# Patient Record
Sex: FEMALE | Race: OTHER | NOT HISPANIC OR LATINO | Employment: FULL TIME | URBAN - METROPOLITAN AREA
[De-identification: names, ages, dates, MRNs, and addresses within clinical notes are randomized per-mention and may not be internally consistent; named-entity substitution may affect disease eponyms.]

---

## 2018-11-16 ENCOUNTER — OFFICE VISIT (OUTPATIENT)
Dept: OTOLARYNGOLOGY | Facility: CLINIC | Age: 33
End: 2018-11-16
Payer: COMMERCIAL

## 2018-11-16 VITALS
WEIGHT: 120 LBS | SYSTOLIC BLOOD PRESSURE: 119 MMHG | TEMPERATURE: 98 F | DIASTOLIC BLOOD PRESSURE: 79 MMHG | HEART RATE: 69 BPM

## 2018-11-16 DIAGNOSIS — J30.89 NON-SEASONAL ALLERGIC RHINITIS, UNSPECIFIED TRIGGER: ICD-10-CM

## 2018-11-16 DIAGNOSIS — J34.3 NASAL TURBINATE HYPERTROPHY: ICD-10-CM

## 2018-11-16 DIAGNOSIS — J32.4 CHRONIC PANSINUSITIS: Primary | ICD-10-CM

## 2018-11-16 PROCEDURE — 99204 OFFICE O/P NEW MOD 45 MIN: CPT | Mod: 25,S$GLB,, | Performed by: OTOLARYNGOLOGY

## 2018-11-16 PROCEDURE — 31231 NASAL ENDOSCOPY DX: CPT | Mod: S$GLB,,, | Performed by: OTOLARYNGOLOGY

## 2018-11-16 PROCEDURE — 99999 PR PBB SHADOW E&M-NEW PATIENT-LVL III: CPT | Mod: PBBFAC,,, | Performed by: OTOLARYNGOLOGY

## 2018-11-16 RX ORDER — MONTELUKAST SODIUM 10 MG/1
TABLET ORAL
COMMUNITY
Start: 2018-10-08 | End: 2019-06-27 | Stop reason: SDUPTHER

## 2018-11-16 RX ORDER — AZELASTINE 1 MG/ML
SPRAY, METERED NASAL
COMMUNITY
Start: 2018-10-08 | End: 2020-07-31

## 2018-11-16 RX ORDER — FLUTICASONE PROPIONATE 50 MCG
1 SPRAY, SUSPENSION (ML) NASAL DAILY
COMMUNITY

## 2018-11-16 NOTE — PROGRESS NOTES
Referring Provider:    No referring provider defined for this encounter.  Subjective:   Patient: Ron Mckoy [id number], :1985   Visit date:2018 2:06 PM    Chief Complaint:  Sinus/Allergy    HPI:  Ron is a 32 y.o. female who I was asked to see in consultation for evaluation of the following issue(s):    Sinonasal / Allergy: Ron has bilateral mild nasal obstruction. She reports the the following sinonasal symptoms: allergies, facial pain, facial pressure, post-nasal drip, reduced sense of smell and nasal obstruction.  She denies the the following sinonasal symptoms: aspirin intolerance, asthma, headaches, significant history of dental procedure just prior to the onset of sinus problems and significant history of prior facial trauma or fractures. She has been  on medications for these symptoms. Medications: NASAL SALINE, Flonase, Augmentin, Levaquin, Zyrtec, ORAL STEROID(S) and ORAL ANTILEUKOTRIENE(S) (ie. Singulair) which has been ineffective.  She uses nasal saline irrigations daily.  At one point, she was on continuous antibiotic therapy for approximately 1 month.      She has had 5 or 6 sinus infections in the past 12 months.     She has moderate allergic rhinitis with symptoms including eye itchiness, nasal itchiness, nasal rhinorrhea and sneezing.  She denies the following allergy symptoms:   coughing and wheezing    Allergy testing for this patient was not done.    Current smoker:  No      CT sinus from [facility] shows significant mucosal thickening of the bilateral maxillary sinuses, left ethmoid sinuses, left sphenoid and frontal sinuses. Left solange bullosa.     Review of Systems:  Negative unless checked off.  Gen:  []fever   []fatigue  HENT:  []nosebleeds  []dental problem   Eyes:  []photophobia  []visual disturbance  Resp:  []chest tightness []wheezing  Card:  []chest pain  []leg swelling  GI:  []abdominal pain []blood in stool  :  []dysuria  []hematuria  Musc:  []joint  swelling  []gait problem  Skin:  []color change  []pallor  Neuro:  []seizures  []numbness  Hem:  []bruise/bleed easily  Psych:  []hallucinations  []behavioral problems  Allergy/Imm: has No Known Allergies.    Her meds, allergies, medical, surgical, social & family histories were reviewed & updated:  -     She has a current medication list which includes the following prescription(s): azelastine, cetirizine hcl, fluticasone, lactobacillus rhamnosus gg, levonorgest/eth.estradiol/iron, montelukast, and multivitamin.  -     She  has no past medical history on file.   -     She does not have a problem list on file.   -     She  has no past surgical history on file.  -     She  reports that  has never smoked. She does not have any smokeless tobacco history on file.  -     Her family history is not on file.  -     She has No Known Allergies.    Objective:     Physical Exam:  Vitals:  /79   Pulse 69   Temp 98.2 °F (36.8 °C) (Tympanic)   Wt 54.4 kg (120 lb)   General appearance:  Well developed, well nourished    Eyes:  Extraocular motions intact, PERRL    Communication:  no hoarseness, no dysphonia    Ears:  Otoscopy of external auditory canals and tympanic membranes was normal, clinical speech reception thresholds grossly intact, no mass/lesion of auricle.  Nose:  No masses/lesions of external nose, nasal mucosa, septum, and turbinates were within normal limits.  Mouth:  No mass/lesion of lips, teeth, gums, hard/soft palate, tongue, tonsils, or oropharynx.    Cardiovascular:  No pedal edema; Radial Pulses +2     Neck & Lymphatics:  No cervical lymphadenopathy, no neck mass/crepitus/ asymmetry, trachea is midline, no thyroid enlargement/tenderness/mass.    Psych: Oriented x3,  Alert with normal mood and affect.     Respiration/Chest:  Symmetric expansion during respiration, normal respiratory effort.    Skin:  Warm and intact. No ulcerations of face, scalp, neck.    Assessment & Plan:   Ron was seen today for  other.    Diagnoses and all orders for this visit:    Chronic pansinusitis  -     Nasal/sinus endoscopy    Non-seasonal allergic rhinitis, unspecified trigger    Nasal turbinate hypertrophy  -     Nasal/sinus endoscopy      Ron has chronic sinusitis without polyps and has been on maximal medical therapy as outlined in the HPI.  My recommendation is for endoscopic sinus surgery including balloon sinuplasty of bilateral frontal, sphenoid, maxillary sinuses, partial ethmoidectomies, SMR of turbinates.   We discussed the need for postoperative debridements as well as chronic allergy management postoperatively. Will obtain antigen specific skin testing with intent to start her on immunotherapy.    We discussed at length the risk of sinus surgery including, but not limited to, recurrence of disease, bleeding, infection, septal perforation, tooth or cheek numbness, vision changes, orbital injury, CSF leak and changes in smell.  The patient had opportunity to ask questions and I answered all of them to their satisfaction.  We will proceed as planned.        Thank you for allowing me to participate in the care of Ron.    Toni Carbone MD      Patient: Ron Larioslyly [id number], :1985  Procedure date:2018  Patient's medications, allergies, past medical, surgical, social and family histories were reviewed and updated as appropriate.  Chief Complaint:  Other (second opinion for lymph nodes)    HPI:  Ron is a 32 y.o. female with the history of present illness as discussed in the clinic note from today.    Procedure: Risks, benefits, and alternatives of the procedure were discussed with the patient, and the patient consented to the nasal endoscopy.  The nasal cavity was sprayed with a topical decongestant and anesthetic (if needed). The endoscope was passed into each nostril and each nasal cavity was visualized.  On each side the nasal cavity, sinuses (if open), turbinates, and septum were examined with the  findings described below. At the end of the examination, the scope was removed. The patient tolerated the procedure well with no complications.       Endoscopic Sinonasal Exam Findings:  -     The right side has moderate edema  -     The left side has moderate edema  -     Nasal secretions: No discolored secretions noted bilaterally  -     Nasal septum: no significant deviation or perforation appreciated   -     Inferior turbinate: hypertrophy or edema (Moderate) bilaterally  -     Middle turbinate: hypertrophy or edema (Severe) on the left  -     Other findings: No mass or obstructive lesion    Assessment & Plan:  - see today's clinic note

## 2018-11-28 ENCOUNTER — CLINICAL SUPPORT (OUTPATIENT)
Dept: OTOLARYNGOLOGY | Facility: CLINIC | Age: 33
End: 2018-11-28
Payer: COMMERCIAL

## 2018-11-28 DIAGNOSIS — J30.9 ALLERGIC RHINITIS, UNSPECIFIED SEASONALITY, UNSPECIFIED TRIGGER: ICD-10-CM

## 2018-11-28 PROCEDURE — 99999 PR PBB SHADOW E&M-EST. PATIENT-LVL II: CPT | Mod: PBBFAC,,,

## 2018-11-28 PROCEDURE — 95004 PERQ TESTS W/ALRGNC XTRCS: CPT | Mod: S$GLB,,, | Performed by: OTOLARYNGOLOGY

## 2018-11-28 PROCEDURE — 95024 IQ TESTS W/ALLERGENIC XTRCS: CPT | Mod: S$GLB,,, | Performed by: OTOLARYNGOLOGY

## 2018-11-28 RX ORDER — AMOXICILLIN 500 MG
1 CAPSULE ORAL DAILY
COMMUNITY
End: 2020-08-07

## 2018-11-28 RX ORDER — OXYCODONE AND ACETAMINOPHEN 5; 325 MG/1; MG/1
TABLET ORAL
Qty: 6 TABLET | Refills: 0 | Status: SHIPPED | OUTPATIENT
Start: 2018-11-28 | End: 2019-01-22 | Stop reason: SDUPTHER

## 2018-11-28 RX ORDER — DIAZEPAM 5 MG/1
TABLET ORAL
Qty: 1 TABLET | Refills: 0 | Status: SHIPPED | OUTPATIENT
Start: 2018-11-28 | End: 2019-01-22

## 2018-11-28 NOTE — Clinical Note
Good morning - I did allergy testing on this patient of your's.  She mentioned that she is interested in pursuing SLIT.  I told her that I would forward you her results and see if you and Dr. Carbone are ok with getting her started on that therapy.  Thanks,Dionne

## 2018-11-28 NOTE — TELEPHONE ENCOUNTER
Patient:  Ron Marin MRN:  [id number]  :  1985  Ordering Physician:  Toni Carbone MD     SLIT formulation - according to MQT Results         RED VIAL:  Sublingually as directed   Fusarium 1:40 w/v 1mL   [place] Birch 1:20 w/v 1mL   Bald [place] 1:80 w/v 1mL   Nettle Weed 1:40 w/v 1mL   Addison Grass 1:20 w/v 1mL   Bermuda Grass 10,000 BAU / mL 1mL   Cockroach  1:20 w/v 1mL   Mold Mix #1 1:20 w/v 1mL   Central / Easter Tree Mix #4 1:20 w/v 1mL   National Weed Mix 1:20 w/v 1mL   Grass Mix GS7 10,000 BAU / mL 1mL   Standardized Mite Mix 5,000 AU / mL each 1mL       GREEN VIAL FORMULATION: sublingually as directed   Volume added from RED Maintenance vial  0.25 mL   50% Glycerin  1mL

## 2018-11-28 NOTE — PROGRESS NOTES
After two patient identifiers and written consent were obtained, patient's allergies and medications were reviewed and changes were made as necessary. Testing was discussed in detail. Patient confirmed she does not have asthma, has not been experienced wheezing within the last seven days, has not used an inhaler within the last seven days, is not currently on a beta blocker, and is not on any other medications that are contraindicated for allergy testing.    The patient's forearms were cleansed with alcohol, and the allergen extracts were applied using the Skintestor OMNI device according to departmental procedures and guidelines.  Further intradermal skin testing was then completed using dilutions from allergens on the diagnostic panel according to departmental procedures and guidelines.    No past medical history on file.  Review of patient's allergies indicates:  No Known Allergies    Results obtained from the Modified Quantitative Testing (MQT), including skin endpoint titration (SET) are as follows:    ALLERGENS HIGHLIGHTED RED - ENDPOINT 6  ALLERGENS HIGHLIGHTED ORANGE - ENDPOINT 5    Allergen MTII Wheal   SIZE Dilution #   to be tested Intradermal   Wheal Size MQT   ENDPOINT   Histamine   (+ control) 9      *Alternaria 5 5 5 4   *Apergillus 7 5 0 4   Fusarium 5 5 7 5   *Cladosporium Sphaer 9 - - 6   Cladosporium Herb 3 5 3 4   *Penicillium Polina 5 5 3 4   Mucor Race 3 5 5 4   *Bipolaris 3 5 5 4   Glycerin   (- control) 0             *American Elm 5 5 3 4   *Stuart 9 - - 6   Carmel 0 2 3 0   *Pecan 9 - - 6   *[place] 7 5 3 4   Gautam 7 5 3 4   Briscoe Birch 9 - - 6   Red [place] 5 5 0 4   Bald [place] 9 - - 6   Red Bertie 0 2 3 0          *Short Ragweed 5 5 0 4   English Plantain 3 5 3 4   Marsh Elder 0 2 5 0   Mugwort Jacob 3 5 5 4   Dock-Sorrel Mix 0 2 7 3   *Spiny Pigweed 9 - - 6   Baccharis Weed 7 5 0 4   *Cocklebur [place] 5 5 3 4   *Lambs Quarter 5 5 5 4   Nettle Weed 5 5 7 5          Addison Grass 7 5  9 6   Bermuda Grass 5 5 7 5   *Esequiel Grass 5 5 3 4   *[place] 7 5 3 4   *Farinae Mite 9 - - 6   *Pteronyssiunus Mite 9 - - 6   Cockroach 9 - - 6   Cat Hair 5 5 5 4   Dog Epithelia 7 5 5 4   Feather Mix 7 5 3 4     *Allergens included in sublingual allergen mixtures    Patient tolerated testing well, no complaints of pain, discomfort, or shortness of breath. Information on allergens and methods to prevent exposure provided to patient. Discussed the different forms of immunotherapy offered by our office. Patient is scheduled to see Dr. Carbone on 18 advised that Dr. Carbone will review allergy testing in detail at that time. Instructed to contact our office with any questions or concerns. Patient verbalized understanding.     Mrs. Marin expressed interest in pursuing sublingual immunotherapy.  Below is the suggested SLIT formulation, according to her MQT results listed above.    Patient:  Ron Marin MRN:  [id number]  :  1985  Ordering Physician:  Toni Carbone MD    SLIT formulation - according to MQT Results  RED VIAL:  Sublingually as directed   Fusarium 1:40 w/v 1mL   Laurel Birch 1:20 w/v 1mL   Bald [place] 1:80 w/v 1mL   Nettle Weed 1:40 w/v 1mL   Addison Grass 1:20 w/v 1mL   Bermuda Grass 10,000 BAU / mL 1mL   Cockroach  1:20 w/v 1mL   Mold Mix #1 1:20 w/v 1mL   Central / Easter Tree Mix #4 1:20 w/v 1mL   National Weed Mix 1:20 w/v 1mL   Grass Mix GS7 10,000 BAU / mL 1mL   Standardized Mite Mix 5,000 AU / mL each 1mL   GREEN VIAL FORMULATION: sublingually as directed   Volume added from RED Maintenance vial  0.25 mL   50% Glycerin  1mL

## 2018-12-12 ENCOUNTER — PATIENT MESSAGE (OUTPATIENT)
Dept: OTOLARYNGOLOGY | Facility: CLINIC | Age: 33
End: 2018-12-12

## 2018-12-14 RX ORDER — METHYLPREDNISOLONE 4 MG/1
TABLET ORAL
Qty: 1 PACKAGE | Refills: 0 | Status: SHIPPED | OUTPATIENT
Start: 2018-12-14 | End: 2019-01-04

## 2018-12-14 NOTE — PROGRESS NOTES
Patient called in for increased nasal congestion x 2-3 days. She reports increased facial pain /pressure. Denies discolored mucus, however, c/o increased thick, clear rhinorrhea and post-nasal drip. She is taking anti-histamine QHS, performing daily nasal saline rinses BID, Flonase/Astelin, and mucinex/ sudafed. She denies any fever or cough. She is scheduled for a balloon sinusplasty and bilateral inferior turbinate reduction next week with Dr. Carbone.     After speaking with Dr. Carbone, will issue the pt a Medrol Dosepak. This will not interfere with her procedure scheduled for next week. Advised pt to please contact us if symptoms do not improve, worsen, or change. Pt voiced agreeing.

## 2018-12-18 ENCOUNTER — TELEPHONE (OUTPATIENT)
Dept: OTOLARYNGOLOGY | Facility: CLINIC | Age: 33
End: 2018-12-18

## 2018-12-18 NOTE — TELEPHONE ENCOUNTER
I called Walter with referrals department at [phone] ext: [phone]. She stated [facility] denied procedure due to stating the CT scan was not submitted in whole form and without a signiture. Walter spoke with the nurse with [employer], Tita, and clarified there is a signature at the bottom of the CT report. [employer] stated they need medical records of office visits of at least 12 weeks of medication failure(s) prior to CT performed and what medications were specifically issued and patient failed tx with. Walter gave me Tita's direct #, [phone], referral #[id number]. I then called Tita and left a vm. I called Walter back to let her know I was unable to reach Tita and if she hears from her or is able to get a hold of her to please let me know and I will speak to her at anytime regarding the above clinical information.     To note: In Dr. Carbone's last OV HPI, he does list the specific medications the pt failed prior to her CT and that she was taking this combination of antibiotics/steriods/ anti-histamines for over one month. She was also seen by ENT with KRISTINE and all records prior to seeing us are available under care everywhere. This includes prior ENT OV notes with Dr. Kizzy Odell and her CT in May. I expressed in my vm that the denial/ additional time to review this procedure has caused regression in patient's sinus disease and since we have had to provide additional medication therapy.  ------------------------------------------------------------    Tita returned my call.   She stated [facility] is denying procedure due to needing proof of medications issued and failed, dates of when issued by which providers prior to the patients CT. Stated this needs to be at least 12 weeks of failed medical therapy and proof of prior to patient's CT. Stated sinus cavity criteria for balloon is 3 mm or greater of disease, they will not be able to approve ethmoids. Stated maxillary cavities should be approved. I clarified per Dr. Rios note we  are requesting bilateral maxillary, bilateral frontals, left ethmoid, and left sphenoid. Tita gave me the fax # to fax additional records: [phone]  I explained pt has been seen by chronic sinusitis since Oct 2017 documented with meds failed prior to May CT. This was with KRISTINE, both her PCP and ENT. I referenced Dr. Carbone's OV note which reviews all of this in specifics of medications and how long patient took them. Tita explained this will not suffice for [employer] and that they are requesting proof and stated pharmacy records may help support OV note(s).     I contacted our nurse to let her know the above and to fax additional records.

## 2018-12-21 ENCOUNTER — TELEPHONE (OUTPATIENT)
Dept: OTOLARYNGOLOGY | Facility: CLINIC | Age: 33
End: 2018-12-21

## 2018-12-21 NOTE — TELEPHONE ENCOUNTER
Faxed all care everywhere records to [facility] at [phone] to assist with authorization for in office procedure with Dr. Toni Carbone.

## 2018-12-21 NOTE — TELEPHONE ENCOUNTER
Released faxed to [facility] for CT scan from 5/11/18 to submit to [employer] for authorization for procedure with Dr. Carbone.

## 2018-12-26 ENCOUNTER — CLINICAL SUPPORT (OUTPATIENT)
Dept: OTOLARYNGOLOGY | Facility: CLINIC | Age: 33
End: 2018-12-26

## 2018-12-26 DIAGNOSIS — J30.9 ALLERGIC RHINITIS, UNSPECIFIED SEASONALITY, UNSPECIFIED TRIGGER: ICD-10-CM

## 2018-12-26 PROCEDURE — 95199 UNLISTED ALL/IMMLG SVC/PX: CPT | Mod: S$GLB,,, | Performed by: OTOLARYNGOLOGY

## 2018-12-26 NOTE — PROGRESS NOTES
Patient was given verbal and written instructions  on how to use the sublingual build- up vials.  Patient received first dose in the clinic and waited 20 minutes patient did not have a reaction. Patient had no further questions and will call the clinic when she is ready for her next maintenance vial.    Patient:  Ron Marin MRN:  [id number]  :  1985  Ordering Physician:  Toni Carbone MD     SLIT formulation - according to MQT Results                        RED VIAL:  Sublingually as directed      Fusarium 1:40 w/v 1mL      [place] Birch 1:20 w/v 1mL      Bald [place] 1:80 w/v 1mL      Nettle Weed 1:40 w/v 1mL      Addison Grass 1:20 w/v 1mL      Bermuda Grass 10,000 BAU / mL 1mL      Cockroach  1:20 w/v 1mL      Mold Mix #1 1:20 w/v 1mL      Central / Easter Tree Mix #4 1:20 w/v 1mL      National Weed Mix 1:20 w/v 1mL      Grass Mix GS7 10,000 BAU / mL 1mL      Standardized Mite Mix 5,000 AU / mL each 1mL               GREEN VIAL FORMULATION: sublingually as directed      Volume added from RED Maintenance vial  0.25 mL      50% Glycerin  1mL

## 2019-01-08 ENCOUNTER — PROCEDURE VISIT (OUTPATIENT)
Dept: OTOLARYNGOLOGY | Facility: CLINIC | Age: 34
End: 2019-01-08
Payer: COMMERCIAL

## 2019-01-08 ENCOUNTER — TELEPHONE (OUTPATIENT)
Dept: OTOLARYNGOLOGY | Facility: CLINIC | Age: 34
End: 2019-01-08

## 2019-01-08 VITALS — HEART RATE: 62 BPM | DIASTOLIC BLOOD PRESSURE: 73 MMHG | SYSTOLIC BLOOD PRESSURE: 99 MMHG

## 2019-01-08 DIAGNOSIS — J34.3 NASAL TURBINATE HYPERTROPHY: ICD-10-CM

## 2019-01-08 DIAGNOSIS — J32.4 CHRONIC PANSINUSITIS: Primary | ICD-10-CM

## 2019-01-08 PROCEDURE — 31296 NSL/SINS NDSC SURG FRNT SINS: CPT | Mod: 51,RT,S$GLB, | Performed by: OTOLARYNGOLOGY

## 2019-01-08 PROCEDURE — 30140 RESECT INFERIOR TURBINATE: CPT | Mod: 50,S$GLB,, | Performed by: OTOLARYNGOLOGY

## 2019-01-08 PROCEDURE — 31296 PR ENDOSCOPY, NASAL/SINUS, FRNTL SINUS OSTIUM, W/BALLOON DILAT: ICD-10-PCS | Mod: 51,RT,S$GLB, | Performed by: OTOLARYNGOLOGY

## 2019-01-08 PROCEDURE — 30140 PR EXCISION TURBINATE,SUBMUCOUS: ICD-10-PCS | Mod: 50,S$GLB,, | Performed by: OTOLARYNGOLOGY

## 2019-01-08 PROCEDURE — 31295 PR ENDOSCOPY, NASAL/SINUS, MAXILL SINUS OSTIUM, W/BALLOON DILAT: ICD-10-PCS | Mod: 50,51,S$GLB, | Performed by: OTOLARYNGOLOGY

## 2019-01-08 PROCEDURE — 31295 NSL/SINS NDSC SURG MAX SINS: CPT | Mod: 50,51,S$GLB, | Performed by: OTOLARYNGOLOGY

## 2019-01-08 PROCEDURE — 31254 NSL/SINS NDSC W/PRTL ETHMDCT: CPT | Mod: 50,51,S$GLB, | Performed by: OTOLARYNGOLOGY

## 2019-01-08 PROCEDURE — 31254 PR NASAL/SINUS ENDOSCOPY,REMV PART ETHMOID: ICD-10-PCS | Mod: 50,51,S$GLB, | Performed by: OTOLARYNGOLOGY

## 2019-01-08 PROCEDURE — 31297 PR ENDOSCOPY, NASAL/SINUS, SPHEN SINUS OSTIUM, W/BALLOON DILAT: ICD-10-PCS | Mod: 50,59,S$GLB, | Performed by: OTOLARYNGOLOGY

## 2019-01-08 PROCEDURE — 31297 NSL/SINS NDSC SURG SPHN SINS: CPT | Mod: 50,59,S$GLB, | Performed by: OTOLARYNGOLOGY

## 2019-01-08 RX ORDER — DIAZEPAM 5 MG/1
5 TABLET ORAL EVERY 6 HOURS PRN
Qty: 5 TABLET | Refills: 0 | Status: SHIPPED | OUTPATIENT
Start: 2019-01-08 | End: 2019-01-22 | Stop reason: ALTCHOICE

## 2019-01-08 RX ORDER — OXYCODONE AND ACETAMINOPHEN 5; 325 MG/1; MG/1
1 TABLET ORAL EVERY 4 HOURS PRN
Qty: 10 TABLET | Refills: 0 | Status: SHIPPED | OUTPATIENT
Start: 2019-01-08 | End: 2019-01-22

## 2019-01-08 RX ORDER — AMOXICILLIN AND CLAVULANATE POTASSIUM 875; 125 MG/1; MG/1
1 TABLET, FILM COATED ORAL EVERY 12 HOURS
Qty: 14 TABLET | Refills: 0 | Status: SHIPPED | OUTPATIENT
Start: 2019-01-08 | End: 2019-01-15

## 2019-01-08 NOTE — TELEPHONE ENCOUNTER
Pt was calling to verify that her procedure has been approved.  Advised pt that per her referral it is approved.  Pt verbalized understanding.

## 2019-01-08 NOTE — TELEPHONE ENCOUNTER
----- Message from Balwinder Lagunas sent at 1/8/2019 10:49 AM CST -----  Contact: self [phone]  Would like to consult with nurse regarding procedure today. Please call back at [phone].  Md Dillon

## 2019-01-09 NOTE — PROCEDURES
Procedure Note       Surgeon: Toni Carbone MD    Pre Procedure Diagnosis:  Chronic pansinusitis, turbinate hypertrophy    Post Procedure Diagnosis: Same    Anesthesia: Topical Ponticaine 5%, Lidocaine 1% with epinephrine 1/100K    Technical Procedures Used:     bilateral balloon maxillary sinuplasty  bilateral partial ethmoidectomy  bilateral balloon sphenoid dilation  right balloon frontal sinus dilation    Complications: No    Estimated Blood Loss:  30cc           Justification for the operation:     Ron has a history of chronic sinusitis documented by history, physical and CT scan and has been on maximal medical therapy as outlined in prior clinic notes.  We discussed at length the risk of sinus surgery including, but not limited to, recurrence of disease, bleeding, infection, septal perforation, tooth or cheek numbness, vision changes, orbital injury, CSF leak and changes in smell.  The patient had opportunity to ask questions and I answered all of them to their satisfaction.  We will proceed as planned.      Procedure in Detail:          The nasal cavities were decongested with topical neosynephrine and 2.5% ponticaine spray.  Following this, pledgets were placed into the nasal cavity soaked in 5% ponticaine and allowed to remain for approximately 15 minutes.  Finally 5 cc of 1% lidocaine with epinephrine 1/100k were injected into the middle turbinate, inferior turbinate and OMC bilateral.      I began the procedure on the right, inspecting the nasal cavity with the 30 degree endoscope.  The middle turbinate was gently medialized using a Breathitt elevator allowing for visualization of the uncinate, ethmoid bulla and frontal sinus recess.    I then performed a right sided partial ethmoidectomy.  The ethmoid sinuses were entered through the bulla with use of the Zigabid 4 mm micro debrider and non through cut forceps..   Care was taken to not disrupt the lamina papyracea, fovea ethmoidalis, or middle/superior  turbinate attachment to skull base during the complete dissection.  About 0% ethmoid mucosa was exenterated.  The ethmoids had marked edema with polypoid changes and - no discolored secretions noted -.  Additional details/findings: none    The same procedure was performed on the other side describing a   partial  ethmoidectomy.  This ethmoid had marked edema with polypoid changes and - no discolored secretions noted -.  Additional details/findings: none    We then performed a right sided maxillary balloon dilation using the Acclarent SPIN maxillary balloon sinuplasty (6X16mm) .   The maxillary sinus was entered through the natural ostium with the guidewire and confirmed by transillumination of the cheek.  The balloon was then inflated to 12 john with sequential dilation of the maxillary ostium.  The maxillary had normal mucosa and - no discolored secretions noted -.  Additional details/findings: none      The same procedure was performed on the other side describing a maxillary balloon dilation using the Acclarent SPIN maxillary balloon sinuplasty (6X16mm).  This maxillary had moderate edema and - no discolored secretions noted -.  Additional details/findings: none    We then performed a right sided sphenoid balloon dilation using the Acclarent SPIN balloon sinuplasty (6X16mm).   The sinus was entered through the natural ostium.  The middle turbinate was partially lateralized, the superior turbinate was visualized and the ostium was entered.  The balloon was then inflated to 12 john with sequential dilation.   The sphenoid had normal mucosa and - no discolored secretions noted -.  Additional details/findings: none      The same procedure was performed on the other side describing a sphenoid balloon dilation using the Acclarent SPIN balloon sinuplasty (6X16mm).  This sinus had mild edema and - no discolored secretions noted -.  Additional details/findings: none.      We then performed a right sided frontal balloon  dilation using the Acclarent SPIN balloon sinuplasty (6X16mm).   The sinus was entered through the natural ostium.  The middle turbinate was partially lateralized, the superior turbinate was visualized and the ostium was entered.  The balloon was then inflated to 12 john with sequential dilation.   The sphenoid had normal mucosa and - no discolored secretions noted -.  Additional details/findings: none      Attempted to performed the same procedure on the left side but after numerous attempts, was unable to enter the natural ostium of the left frontal sinus.    I then performed submucosal resection of the inferior turbinates. The eleni 2mm turbinate blade was used to make an incision in the head of the inferior turbinate.  The instrument was then tunneled submucosally along the inferior turbinate and the instrument was activated.  Several passes superiorly and inferiorly were made until there was significant reduction of the tissue and a patent nasal passageway. The nasal cavity was suctioned free of blood and saline and found to be hemostatic.  The same procedure was performed on the left side.  However, she began to have fairly profuse bleeding from the left side while this was being performed.  After trying to cauterize this with the Coblator, this ultimately persisted in bleeding. A Merocel nasal packing was placed in the left side.  At this point, she began bleeding from the right inferior turbinate.  Again I cauterized this area but it continued to significantly bleed so this side was packed as well.  At this point there was no further bleeding.      The patient tolerated the procedure well and had no significant pain at completion of the case.

## 2019-01-22 ENCOUNTER — OFFICE VISIT (OUTPATIENT)
Dept: OTOLARYNGOLOGY | Facility: CLINIC | Age: 34
End: 2019-01-22
Payer: COMMERCIAL

## 2019-01-22 VITALS — HEART RATE: 67 BPM | SYSTOLIC BLOOD PRESSURE: 112 MMHG | DIASTOLIC BLOOD PRESSURE: 78 MMHG | WEIGHT: 124.56 LBS

## 2019-01-22 DIAGNOSIS — J32.4 CHRONIC PANSINUSITIS: Primary | ICD-10-CM

## 2019-01-22 DIAGNOSIS — J30.89 NON-SEASONAL ALLERGIC RHINITIS, UNSPECIFIED TRIGGER: ICD-10-CM

## 2019-01-22 PROCEDURE — 99999 PR PBB SHADOW E&M-EST. PATIENT-LVL III: ICD-10-PCS | Mod: PBBFAC,,, | Performed by: OTOLARYNGOLOGY

## 2019-01-22 PROCEDURE — 99024 POSTOP FOLLOW-UP VISIT: CPT | Mod: S$GLB,,, | Performed by: OTOLARYNGOLOGY

## 2019-01-22 PROCEDURE — 87077 CULTURE AEROBIC IDENTIFY: CPT

## 2019-01-22 PROCEDURE — 99999 PR PBB SHADOW E&M-EST. PATIENT-LVL III: CPT | Mod: PBBFAC,,, | Performed by: OTOLARYNGOLOGY

## 2019-01-22 PROCEDURE — 87186 SC STD MICRODIL/AGAR DIL: CPT

## 2019-01-22 PROCEDURE — 87070 CULTURE OTHR SPECIMN AEROBIC: CPT

## 2019-01-22 PROCEDURE — 99024 PR POST-OP FOLLOW-UP VISIT: ICD-10-PCS | Mod: S$GLB,,, | Performed by: OTOLARYNGOLOGY

## 2019-01-22 RX ORDER — OXYCODONE AND ACETAMINOPHEN 5; 325 MG/1; MG/1
TABLET ORAL
Qty: 6 TABLET | Refills: 0 | Status: CANCELLED | OUTPATIENT
Start: 2019-01-22

## 2019-01-22 RX ORDER — OXYCODONE AND ACETAMINOPHEN 5; 325 MG/1; MG/1
TABLET ORAL
Qty: 6 TABLET | Refills: 0 | Status: SHIPPED | OUTPATIENT
Start: 2019-01-22 | End: 2019-01-22

## 2019-01-22 NOTE — PROGRESS NOTES
Subjective:   Patient: Ron Marin [id number], :1985   Visit date:2019 2:27 PM    Chief Complaint: Status post sinus surgery    HPI:  Ron is a 33 y.o. female with Chronic sinusitis.    Subjective:  She was feeling fairly well until the last 2 or 3 days.  She began having more facial pressure and pain as well as nasal drainage.  She has been compliant with nasal saline irrigations.    Surgery: 2019    Sinuses operated/OR findings:   Balloon dilation of bilateral frontal, maxillary, sphenoid  Bilateral partial ethmoidectomies  Solitario SMR turbinates      Review of Systems:  -     Allergic/Immunologic: has No Known Allergies..  -     Constitutional: Current temp:  afebrile    Her meds, allergies, medical, surgical, social & family histories were reviewed & updated:  -     She has a current medication list which includes the following prescription(s): allergy mix, azelastine, cetirizine hcl, diazepam, fish oil-omega-3 fatty acids, fluticasone, lactobacillus rhamnosus gg, levonorgest/eth.estradiol/iron, montelukast, multivitamin, oxycodone-acetaminophen, and oxycodone-acetaminophen.  -     She  has no past medical history on file.   -     She does not have any pertinent problems on file.   -     She  has no past surgical history on file.  -     She  reports that  has never smoked. She does not have any smokeless tobacco history on file.  -     Her family history is not on file.  -     She has No Known Allergies.    Objective:   Physical Exam:  Vitals:  /78   Pulse 67   Wt 56.5 kg (124 lb 9 oz)   General appearance:  Well developed, well nourished    Eyes:  Extraocular motions intact, PERRL    Communication:  no hoarseness, no dysphonia    Ears:  Otoscopy of external auditory canals and tympanic membranes was normal, clinical speech reception thresholds grossly intact, no mass/lesion of auricle.  Nose:  No masses/lesions of external nose, nasal mucosa, septum, and turbinates were within  normal limits.  Mouth:  No mass/lesion of lips, teeth, gums, hard/soft palate, tongue, tonsils, or oropharynx.    Cardiovascular:  No pedal edema; Radial Pulses +2     Neck & Lymphatics:  No cervical lymphadenopathy, no neck mass/crepitus/ asymmetry, trachea is midline, no thyroid enlargement/tenderness/mass.    Psych: Oriented x3,  Alert with normal mood and affect.     Respiration/Chest:  Symmetric expansion during respiration, normal respiratory effort.    Skin:  Warm and intact. No ulcerations of face, scalp, neck.      Assessment & Plan:   Chronic Rhinosinusitis s/p endoscopic sinus surgery.  Individualized endoscopic debridements following routine functional endoscopic sinus surgery (FESS) provide improved long-term outcomes and may prevent future, more extensive revision procedures. Although two to three debridements in the first 30 days is typical for the majority of patients, once a week may be an appropriate frequency for postoperative debridement  in select patients with difficult problems. However, the frequency and length of time for which debridement is medically necessary will vary from case to case and must be individualized, a conclusion, which multiple studies analyzing debridement outcomes have acknowledged.  While 2-3 debridements will likely suffice in the majority of cases, there are situations in which a patient may require more frequent, very long-term debridements. Clinically, these include, but are not limited to, persistent crusting within the surgical bed, adhesion formation noted upon examination, more extensive surgery (eg, complex frontal sinusotomies, neoplasm  resections), underlying immunologic disorders, diffuse polyposis, revision FESS, mucociliary disorders, allergic fungal sinusitis, and postoperative complications (eg, visual loss, cerebrospinal fluid leak, nasal hemorrhage).     Asima presents today with significant crusting, discharge, synechia formation or narrowing of sinus  ostia.  Therefore, the decision for endoscopic sinus debridement was determined to be necessary.    Ron will return to clinic in 3 weeks.    medical regimen: Flonase, astelin, singulair, xyzal.  SLIT    Will plan for budesonide irrigations.  Cultures taken today.          NASAL ENDOSCOPY WITH POLYPECTOMY &/OR DEBRIDEMENT  (cpt 92682)  Procedure: Risks, benefits, and alternatives of the procedure were discussed with the patient, and the patient consented to the nasal endoscopy with debridement.  The nasal cavity was sprayed with a topical decongestant and anesthetic . The endoscope was passed into each nostril and each nasal cavity was visualized.  On each side the nasal cavity, sinuses (if open), turbinates, and septum were examined with the findings described below.  Crusting and polypoid material was removed with suction and straight non thru cut forceps.   At the end of the examination, the scope was removed. The patient tolerated the procedure well with no complications.     Endoscopic Sinonasal Exam Findings:  -     Sinuses examined: bilateral maxillary, bilateral ethmoid anterior, bilateral sphenoid and bilateral frontal            The right sinus(es) have mild edema            The left sinus(es) have normal mucosa  -     Nasal secretions: dried crusts, purulent secretions and thick glue-like mucous on the right  -     Nasal septum: no significant deviation and no perforation appreciated   -     Inferior turbinate: - normal mucosa without significant hypertrophy - bilaterally  -     Middle turbinate: turbinate partially resected on the right  -     Other findings: - no mass or obstructive lesion -    Assessment & Plan:  See today's clinic note.

## 2019-01-25 LAB — BACTERIA SPEC AEROBE CULT: NORMAL

## 2019-01-25 RX ORDER — FLUCONAZOLE 100 MG/1
100 TABLET ORAL DAILY
Qty: 14 TABLET | Refills: 0 | Status: SHIPPED | OUTPATIENT
Start: 2019-01-25 | End: 2019-02-08

## 2019-01-25 RX ORDER — SULFAMETHOXAZOLE AND TRIMETHOPRIM 800; 160 MG/1; MG/1
1 TABLET ORAL 2 TIMES DAILY
Qty: 20 TABLET | Refills: 0 | Status: SHIPPED | OUTPATIENT
Start: 2019-01-25 | End: 2019-01-29

## 2019-01-27 ENCOUNTER — OFFICE VISIT (OUTPATIENT)
Dept: URGENT CARE | Facility: CLINIC | Age: 34
End: 2019-01-27
Payer: COMMERCIAL

## 2019-01-27 VITALS
TEMPERATURE: 99 F | HEART RATE: 146 BPM | WEIGHT: 123.25 LBS | HEIGHT: 63 IN | BODY MASS INDEX: 21.84 KG/M2 | DIASTOLIC BLOOD PRESSURE: 70 MMHG | SYSTOLIC BLOOD PRESSURE: 100 MMHG

## 2019-01-27 DIAGNOSIS — R00.0 TACHYCARDIA: ICD-10-CM

## 2019-01-27 DIAGNOSIS — R68.89 FLU-LIKE SYMPTOMS: ICD-10-CM

## 2019-01-27 DIAGNOSIS — R50.9 FEVER, UNSPECIFIED FEVER CAUSE: Primary | ICD-10-CM

## 2019-01-27 LAB
CTP QC/QA: YES
CTP QC/QA: YES
POC MOLECULAR INFLUENZA A AGN: NEGATIVE
POC MOLECULAR INFLUENZA B AGN: NEGATIVE
S PYO RRNA THROAT QL PROBE: NEGATIVE

## 2019-01-27 PROCEDURE — 99214 PR OFFICE/OUTPT VISIT, EST, LEVL IV, 30-39 MIN: ICD-10-PCS | Mod: S$GLB,,, | Performed by: PHYSICIAN ASSISTANT

## 2019-01-27 PROCEDURE — 87502 POCT INFLUENZA A/B MOLECULAR: ICD-10-PCS | Mod: QW,S$GLB,, | Performed by: PHYSICIAN ASSISTANT

## 2019-01-27 PROCEDURE — 99999 PR PBB SHADOW E&M-EST. PATIENT-LVL V: ICD-10-PCS | Mod: PBBFAC,,, | Performed by: PHYSICIAN ASSISTANT

## 2019-01-27 PROCEDURE — 99999 PR PBB SHADOW E&M-EST. PATIENT-LVL V: CPT | Mod: PBBFAC,,, | Performed by: PHYSICIAN ASSISTANT

## 2019-01-27 PROCEDURE — 87880 STREP A ASSAY W/OPTIC: CPT | Mod: QW,S$GLB,, | Performed by: PHYSICIAN ASSISTANT

## 2019-01-27 PROCEDURE — 3008F BODY MASS INDEX DOCD: CPT | Mod: CPTII,S$GLB,, | Performed by: PHYSICIAN ASSISTANT

## 2019-01-27 PROCEDURE — 87502 INFLUENZA DNA AMP PROBE: CPT | Mod: QW,S$GLB,, | Performed by: PHYSICIAN ASSISTANT

## 2019-01-27 PROCEDURE — 99214 OFFICE O/P EST MOD 30 MIN: CPT | Mod: S$GLB,,, | Performed by: PHYSICIAN ASSISTANT

## 2019-01-27 PROCEDURE — 3008F PR BODY MASS INDEX (BMI) DOCUMENTED: ICD-10-PCS | Mod: CPTII,S$GLB,, | Performed by: PHYSICIAN ASSISTANT

## 2019-01-27 PROCEDURE — 87880 POCT RAPID STREP A: ICD-10-PCS | Mod: QW,S$GLB,, | Performed by: PHYSICIAN ASSISTANT

## 2019-01-27 PROCEDURE — 87081 CULTURE SCREEN ONLY: CPT

## 2019-01-27 NOTE — PROGRESS NOTES
"Subjective:      Patient ID: Ron Marin is a 33 y.o. female.    Chief Complaint: flu like symptoms    Ron is a 33 year old female who presents to urgent care with flu like symptoms (fever, chills, body aches, feels flushed, and headaches) that began yesterday and has rapidly gotten worse.  She recently had sinus surgery (1/8 and back for debridement 1/22), and her aerobic culture came back positive for MRSA.  She was begun on bactrim and took first dose at 8 am yesterday morning.  She admits eye redness and sore throat, but denies mouth sores.  She has had the flu shot       Influenza   This is a new problem. The current episode started yesterday. Associated symptoms include chills, coughing (occasional, when laying down ), fatigue, a fever, headaches, myalgias, a sore throat and weakness. Pertinent negatives include no abdominal pain, rash (feels flush ) or vomiting. Treatments tried: tylenol, aleve      Review of Systems   Constitutional: Positive for chills, fatigue and fever.   HENT: Positive for sore throat. Negative for ear pain, mouth sores and trouble swallowing.    Eyes: Positive for redness.   Respiratory: Positive for cough (occasional, when laying down ). Negative for shortness of breath and wheezing.    Gastrointestinal: Negative for abdominal pain and vomiting.   Musculoskeletal: Positive for myalgias.   Skin: Negative for rash (feels flush ).   Neurological: Positive for weakness and headaches.       Objective:   /70 (BP Location: Right arm, Patient Position: Sitting, BP Method: Medium (Manual))   Pulse (!) 146   Temp 99.3 °F (37.4 °C) (Tympanic)   Ht 5' 3" (1.6 m)   Wt 55.9 kg (123 lb 3.8 oz)   BMI 21.83 kg/m²   Physical Exam   Constitutional: She is oriented to person, place, and time. She appears well-developed and well-nourished. She has a sickly appearance. No distress.   HENT:   Head: Normocephalic.   Right Ear: External ear and ear canal normal. A middle ear effusion is " present.   Left Ear: External ear and ear canal normal. A middle ear effusion is present.   Nose: Nose normal. No mucosal edema or rhinorrhea. Right sinus exhibits no maxillary sinus tenderness and no frontal sinus tenderness. Left sinus exhibits no maxillary sinus tenderness and no frontal sinus tenderness.   Mouth/Throat: Uvula is midline and mucous membranes are normal. No oral lesions. Posterior oropharyngeal erythema present. No oropharyngeal exudate or posterior oropharyngeal edema.   Eyes: EOM are normal. Right eye exhibits no discharge and no exudate. Left eye exhibits no discharge (mild) and no exudate. Right conjunctiva is injected. Left conjunctiva is injected.   Neck: Normal range of motion. Neck supple.   Cardiovascular: Regular rhythm. Tachycardia present.   Pulmonary/Chest: Effort normal and breath sounds normal. No accessory muscle usage. No apnea, no tachypnea and no bradypnea. No respiratory distress. She has no decreased breath sounds. She has no wheezes. She has no rhonchi. She has no rales.   Lymphadenopathy:        Head (right side): No submental, no submandibular and no tonsillar adenopathy present.        Head (left side): No submental, no submandibular and no tonsillar adenopathy present.     She has no cervical adenopathy.   Neurological: She is alert and oriented to person, place, and time.   Skin: Skin is warm and dry. She is not diaphoretic.     Assessment:      1. Flu-like symptoms    2. Fever, unspecified fever cause       Plan:   Flu-like symptoms  -     POCT Influenza A/B Molecular    Fever, unspecified fever cause  -     POCT rapid strep A  -     Strep A culture, throat    Flu Like Symptoms    -  Flu test negative    -  With fever, headache, sore throat, conjunctivitis, tachycardia (but no mucosal lesions)- concerned for possible reaction to Bactrim (? rule out early SJS) which was begun yesterday - advised prompt evaluation in the ED - called and gave report to triage at [facility]     AVS provided and instructions reviewed with patient.    Paradise Bradford PA-C   Physician Assistant   [facility]

## 2019-01-28 ENCOUNTER — TELEPHONE (OUTPATIENT)
Dept: OTOLARYNGOLOGY | Facility: CLINIC | Age: 34
End: 2019-01-28

## 2019-01-28 ENCOUNTER — HOSPITAL ENCOUNTER (EMERGENCY)
Facility: HOSPITAL | Age: 34
Discharge: HOME | End: 2019-01-29
Attending: EMERGENCY MEDICINE
Payer: COMMERCIAL

## 2019-01-28 DIAGNOSIS — J32.9 CHRONIC SINUSITIS, UNSPECIFIED LOCATION: Primary | ICD-10-CM

## 2019-01-28 DIAGNOSIS — Z22.322 NOSE COLONIZED WITH MRSA: ICD-10-CM

## 2019-01-28 LAB
ALBUMIN SERPL BCP-MCNC: 3.1 G/DL
ALP SERPL-CCNC: 46 U/L
ALT SERPL W/O P-5'-P-CCNC: 23 U/L
ANION GAP SERPL CALC-SCNC: 10 MMOL/L
AST SERPL-CCNC: 21 U/L
B-HCG UR QL: NEGATIVE
BACTERIA #/AREA URNS HPF: ABNORMAL /HPF
BASOPHILS # BLD AUTO: 0.01 K/UL
BASOPHILS NFR BLD: 0.2 %
BILIRUB SERPL-MCNC: 0.3 MG/DL
BILIRUB UR QL STRIP: NEGATIVE
BUN SERPL-MCNC: 6 MG/DL
CALCIUM SERPL-MCNC: 8.3 MG/DL
CHLORIDE SERPL-SCNC: 100 MMOL/L
CLARITY UR: CLEAR
CO2 SERPL-SCNC: 24 MMOL/L
COLOR UR: YELLOW
CREAT SERPL-MCNC: 0.9 MG/DL
DIFFERENTIAL METHOD: ABNORMAL
EOSINOPHIL # BLD AUTO: 0.3 K/UL
EOSINOPHIL NFR BLD: 5.6 %
ERYTHROCYTE [DISTWIDTH] IN BLOOD BY AUTOMATED COUNT: 12.6 %
EST. GFR  (AFRICAN AMERICAN): >60 ML/MIN/1.73 M^2
EST. GFR  (NON AFRICAN AMERICAN): >60 ML/MIN/1.73 M^2
GLUCOSE SERPL-MCNC: 98 MG/DL
GLUCOSE UR QL STRIP: NEGATIVE
HCT VFR BLD AUTO: 35.1 %
HGB BLD-MCNC: 11.8 G/DL
HGB UR QL STRIP: ABNORMAL
INFLUENZA A, MOLECULAR: NEGATIVE
INFLUENZA B, MOLECULAR: NEGATIVE
KETONES UR QL STRIP: ABNORMAL
LACTATE SERPL-SCNC: 0.9 MMOL/L
LEUKOCYTE ESTERASE UR QL STRIP: ABNORMAL
LYMPHOCYTES # BLD AUTO: 0.7 K/UL
LYMPHOCYTES NFR BLD: 13.9 %
MCH RBC QN AUTO: 28.2 PG
MCHC RBC AUTO-ENTMCNC: 33.6 G/DL
MCV RBC AUTO: 84 FL
MICROSCOPIC COMMENT: ABNORMAL
MONOCYTES # BLD AUTO: 0.4 K/UL
MONOCYTES NFR BLD: 7.3 %
NEUTROPHILS # BLD AUTO: 3.7 K/UL
NEUTROPHILS NFR BLD: 73 %
NITRITE UR QL STRIP: NEGATIVE
PH UR STRIP: 6 [PH] (ref 5–8)
PLATELET # BLD AUTO: 185 K/UL
PMV BLD AUTO: 9.5 FL
POTASSIUM SERPL-SCNC: 3.8 MMOL/L
PROCALCITONIN SERPL IA-MCNC: 0.23 NG/ML
PROT SERPL-MCNC: 6.7 G/DL
PROT UR QL STRIP: NEGATIVE
RBC # BLD AUTO: 4.18 M/UL
RBC #/AREA URNS HPF: 5 /HPF (ref 0–4)
SODIUM SERPL-SCNC: 134 MMOL/L
SP GR UR STRIP: <=1.005 (ref 1–1.03)
SPECIMEN SOURCE: NORMAL
SQUAMOUS #/AREA URNS HPF: 5 /HPF
URN SPEC COLLECT METH UR: ABNORMAL
UROBILINOGEN UR STRIP-ACNC: NEGATIVE EU/DL
WBC # BLD AUTO: 5.04 K/UL
WBC #/AREA URNS HPF: 10 /HPF (ref 0–5)

## 2019-01-28 PROCEDURE — 85025 COMPLETE CBC W/AUTO DIFF WBC: CPT

## 2019-01-28 PROCEDURE — 63600175 PHARM REV CODE 636 W HCPCS: Performed by: EMERGENCY MEDICINE

## 2019-01-28 PROCEDURE — 87040 BLOOD CULTURE FOR BACTERIA: CPT

## 2019-01-28 PROCEDURE — 80053 COMPREHEN METABOLIC PANEL: CPT

## 2019-01-28 PROCEDURE — 81000 URINALYSIS NONAUTO W/SCOPE: CPT

## 2019-01-28 PROCEDURE — 96361 HYDRATE IV INFUSION ADD-ON: CPT

## 2019-01-28 PROCEDURE — 96366 THER/PROPH/DIAG IV INF ADDON: CPT

## 2019-01-28 PROCEDURE — 96365 THER/PROPH/DIAG IV INF INIT: CPT

## 2019-01-28 PROCEDURE — 25500020 PHARM REV CODE 255: Performed by: EMERGENCY MEDICINE

## 2019-01-28 PROCEDURE — 84145 PROCALCITONIN (PCT): CPT

## 2019-01-28 PROCEDURE — 99285 EMERGENCY DEPT VISIT HI MDM: CPT | Mod: 25

## 2019-01-28 PROCEDURE — 36415 COLL VENOUS BLD VENIPUNCTURE: CPT

## 2019-01-28 PROCEDURE — 81025 URINE PREGNANCY TEST: CPT

## 2019-01-28 PROCEDURE — 25000003 PHARM REV CODE 250: Performed by: EMERGENCY MEDICINE

## 2019-01-28 PROCEDURE — 87502 INFLUENZA DNA AMP PROBE: CPT

## 2019-01-28 PROCEDURE — 96375 TX/PRO/DX INJ NEW DRUG ADDON: CPT

## 2019-01-28 PROCEDURE — 83605 ASSAY OF LACTIC ACID: CPT

## 2019-01-28 RX ORDER — LEVOCETIRIZINE DIHYDROCHLORIDE 5 MG/1
5 TABLET, FILM COATED ORAL NIGHTLY
COMMUNITY

## 2019-01-28 RX ORDER — MORPHINE SULFATE 10 MG/ML
4 INJECTION INTRAMUSCULAR; INTRAVENOUS; SUBCUTANEOUS
Status: DISCONTINUED | OUTPATIENT
Start: 2019-01-28 | End: 2019-01-28

## 2019-01-28 RX ORDER — ACETAMINOPHEN 10 MG/ML
1000 INJECTION, SOLUTION INTRAVENOUS ONCE
Status: COMPLETED | OUTPATIENT
Start: 2019-01-28 | End: 2019-01-28

## 2019-01-28 RX ORDER — ONDANSETRON 2 MG/ML
4 INJECTION INTRAMUSCULAR; INTRAVENOUS
Status: DISCONTINUED | OUTPATIENT
Start: 2019-01-28 | End: 2019-01-28

## 2019-01-28 RX ADMIN — SODIUM CHLORIDE 1680 ML: 0.9 INJECTION, SOLUTION INTRAVENOUS at 09:01

## 2019-01-28 RX ADMIN — IOHEXOL 75 ML: 350 INJECTION, SOLUTION INTRAVENOUS at 11:01

## 2019-01-28 RX ADMIN — ACETAMINOPHEN 1000 MG: 10 INJECTION, SOLUTION INTRAVENOUS at 10:01

## 2019-01-29 ENCOUNTER — OFFICE VISIT (OUTPATIENT)
Dept: OTOLARYNGOLOGY | Facility: CLINIC | Age: 34
End: 2019-01-29
Payer: COMMERCIAL

## 2019-01-29 VITALS
SYSTOLIC BLOOD PRESSURE: 95 MMHG | HEIGHT: 63 IN | HEART RATE: 89 BPM | BODY MASS INDEX: 22.5 KG/M2 | DIASTOLIC BLOOD PRESSURE: 59 MMHG | WEIGHT: 127 LBS

## 2019-01-29 VITALS
TEMPERATURE: 99 F | DIASTOLIC BLOOD PRESSURE: 67 MMHG | WEIGHT: 123.44 LBS | OXYGEN SATURATION: 99 % | RESPIRATION RATE: 16 BRPM | HEART RATE: 75 BPM | SYSTOLIC BLOOD PRESSURE: 105 MMHG | HEIGHT: 63 IN | BODY MASS INDEX: 21.87 KG/M2

## 2019-01-29 DIAGNOSIS — J32.4 CHRONIC PANSINUSITIS: Primary | ICD-10-CM

## 2019-01-29 LAB — BACTERIA THROAT CULT: NORMAL

## 2019-01-29 PROCEDURE — 99024 PR POST-OP FOLLOW-UP VISIT: ICD-10-PCS | Mod: S$GLB,,, | Performed by: OTOLARYNGOLOGY

## 2019-01-29 PROCEDURE — 25000003 PHARM REV CODE 250: Performed by: EMERGENCY MEDICINE

## 2019-01-29 PROCEDURE — 87040 BLOOD CULTURE FOR BACTERIA: CPT

## 2019-01-29 PROCEDURE — 99999 PR PBB SHADOW E&M-EST. PATIENT-LVL III: ICD-10-PCS | Mod: PBBFAC,,, | Performed by: OTOLARYNGOLOGY

## 2019-01-29 PROCEDURE — 63600175 PHARM REV CODE 636 W HCPCS: Performed by: EMERGENCY MEDICINE

## 2019-01-29 PROCEDURE — 99024 POSTOP FOLLOW-UP VISIT: CPT | Mod: S$GLB,,, | Performed by: OTOLARYNGOLOGY

## 2019-01-29 PROCEDURE — 99999 PR PBB SHADOW E&M-EST. PATIENT-LVL III: CPT | Mod: PBBFAC,,, | Performed by: OTOLARYNGOLOGY

## 2019-01-29 RX ORDER — LINEZOLID 600 MG/1
600 TABLET, FILM COATED ORAL EVERY 12 HOURS
Qty: 28 TABLET | Refills: 0 | Status: SHIPPED | OUTPATIENT
Start: 2019-01-29 | End: 2019-02-12

## 2019-01-29 RX ADMIN — VANCOMYCIN HYDROCHLORIDE 750 MG: 750 INJECTION, POWDER, LYOPHILIZED, FOR SOLUTION INTRAVENOUS at 12:01

## 2019-01-29 NOTE — ED PROVIDER NOTES
"Encounter Date: 1/28/2019       History     Chief Complaint   Patient presents with    General Illness     Pt states, "I have a fever, my body is hurting, headache, weak feeling."     I                Review of patient's allergies indicates:  No Known Allergies  No past medical history on file.  No past surgical history on file.  No family history on file.  Social History     Tobacco Use    Smoking status: Never Smoker   Substance Use Topics    Alcohol use: Not on file    Drug use: Not on file     Review of Systems    Physical Exam     Initial Vitals [01/28/19 1836]   BP Pulse Resp Temp SpO2   121/68 99 20 (!) 100.5 °F (38.1 °C) 98 %      MAP       --         Physical Exam    ED Course   Procedures  Labs Reviewed   INFLUENZA A & B BY MOLECULAR   CULTURE, BLOOD   CULTURE, BLOOD   CBC W/ AUTO DIFFERENTIAL   COMPREHENSIVE METABOLIC PANEL   LACTIC ACID, PLASMA   URINALYSIS, REFLEX TO URINE CULTURE   PREGNANCY TEST, URINE RAPID          Imaging Results    None                               Clinical Impression:   {Add your Clinical Impression here. If you haven't documented one yet, please pend the note, finalize a Clinical Impression, and refresh your note before signing.:[id number]}                          "

## 2019-01-29 NOTE — PROVIDER PROGRESS NOTES - EMERGENCY DEPT.
SCRIBE #1 NOTE: I, Angela Pa, am scribing for, and in the presence of, Lydia Bradford NP. I have scribed the entire note.        1/28/2019, 8:35 PM   History obtained from the patient      History of Present Illness: Ron Marin is a 33 y.o. female patient who presents to the Emergency Department for evaluation of a possibly allergy to Bactrim. Pt was diagnosed with MRSA 1/22 by Dr. Carbone (ENT) and was started on Bactrim. Pt is c/o a fever, HA, hives, and extremity weakness which all onset 3 days ago. Pt was seen at [facility] yesterday where she was treated with Vancomycin IV, Rocephin IV, and fluids. Pt was discharged on abx but has not started them. She reports sxs are worsening.         8:45 PM: Pt was placed back in lobby. I explained to pt that due to lack of available rooms pt was seen by myself to begin the workup. Pt understands they will be seen and dispositioned by the next available physician. Pt voices understanding and agrees with plan. Pt also understands the longer than normal wait time. I am removing myself from the care of pt and pt will now be assigned to the next available physician.       Physician Attestation Statement for Scribe #1: I, Lydia Bradford NP, personally performed the services described in this documentation, as scribed by Angela Pa, in my presence, and it is both accurate and complete.

## 2019-01-29 NOTE — PROGRESS NOTES
Subjective:   Patient: Ron Marin [id number], :1985   Visit date:2019 2:27 PM    Chief Complaint: Status post sinus surgery    HPI:  Ron is a 33 y.o. female with Chronic sinusitis.    Subjective:  Ron was last seen in clinic .   Cultures were taken of extensive purulent drainage.  They returned as MRSA clinda resistant.  She was placed on Bactrim.   A few days after starting Bactrim she began running fever and developed a rash.  She presented to [facility] ED.  WBC 9.2 with strong left shift.  Bactrim was stopped and she was discharged on Zyvox.   Fevers have been decreasing.  Facial swelling and rash resolved.      Surgery: 2019    Sinuses operated/OR findings:   Balloon dilation of bilateral frontal, maxillary, sphenoid  Bilateral partial ethmoidectomies  Solitario SMR turbinates    Susceptibility      Methicillin resistant staphylococcus aureus     CULTURE, AEROBIC  (SPECIFY SOURCE)     Clindamycin >4  Resistant     Erythromycin >4  Resistant     Oxacillin >2  Resistant     Penicillin >8  Resistant     Tetracycline <=4  Sensitive     Trimeth/Sulfa <=0.5/9.5  Sensitive     Vancomycin 1  Sensitive            Linear View       Review of Systems:  -     Allergic/Immunologic: is allergic to bactrim [sulfamethoxazole-trimethoprim]..  -     Constitutional: Current temp:  afebrile    Her meds, allergies, medical, surgical, social & family histories were reviewed & updated:  -     She has a current medication list which includes the following prescription(s): allergy mix, azelastine, cetirizine hcl, fish oil-omega-3 fatty acids, fluconazole, fluticasone, lactobacillus rhamnosus gg, levocetirizine, levonorgest/eth.estradiol/iron, montelukast, and multivitamin.  -     She  has a past medical history of Environmental allergies.   -     She does not have any pertinent problems on file.   -     She  has a past surgical history that includes Sinus surgery.  -     She  reports that  has never smoked. She  does not have any smokeless tobacco history on file. She reports that she does not drink alcohol or use drugs.  -     Her family history is not on file.  -     She is allergic to bactrim [sulfamethoxazole-trimethoprim].    Objective:   Physical Exam:  Vitals:  There were no vitals taken for this visit.  General appearance:  Well developed, well nourished    Eyes:  Extraocular motions intact, PERRL    Communication:  no hoarseness, no dysphonia    Ears:  Otoscopy of external auditory canals and tympanic membranes was normal, clinical speech reception thresholds grossly intact, no mass/lesion of auricle.  Nose:  No masses/lesions of external nose, nasal mucosa, septum, and turbinates were within normal limits.  Mouth:  No mass/lesion of lips, teeth, gums, hard/soft palate, tongue, tonsils, or oropharynx.    Cardiovascular:  No pedal edema; Radial Pulses +2     Neck & Lymphatics:  No cervical lymphadenopathy, no neck mass/crepitus/ asymmetry, trachea is midline, no thyroid enlargement/tenderness/mass.    Psych: Oriented x3,  Alert with normal mood and affect.     Respiration/Chest:  Symmetric expansion during respiration, normal respiratory effort.    Skin:  Warm and intact. No ulcerations of face, scalp, neck.      Assessment & Plan:   Chronic Rhinosinusitis s/p endoscopic sinus surgery.  Individualized endoscopic debridements following routine functional endoscopic sinus surgery (FESS) provide improved long-term outcomes and may prevent future, more extensive revision procedures. Although two to three debridements in the first 30 days is typical for the majority of patients, once a week may be an appropriate frequency for postoperative debridement  in select patients with difficult problems. However, the frequency and length of time for which debridement is medically necessary will vary from case to case and must be individualized, a conclusion, which multiple studies analyzing debridement outcomes have acknowledged.   While 2-3 debridements will likely suffice in the majority of cases, there are situations in which a patient may require more frequent, very long-term debridements. Clinically, these include, but are not limited to, persistent crusting within the surgical bed, adhesion formation noted upon examination, more extensive surgery (eg, complex frontal sinusotomies, neoplasm  resections), underlying immunologic disorders, diffuse polyposis, revision FESS, mucociliary disorders, allergic fungal sinusitis, and postoperative complications (eg, visual loss, cerebrospinal fluid leak, nasal hemorrhage).     Ron presents today with significant crusting, discharge, synechia formation or narrowing of sinus ostia.  Therefore, the decision for endoscopic sinus debridement was determined to be necessary.    Ron will return to clinic in 3 weeks.    medical regimen: Flonase, astelin, singulair, xyzal.  SLIT    Vancomycin and Budesonide Irrigations  Start Zyvox- discussed with Dawson this am        NASAL ENDOSCOPY WITH POLYPECTOMY &/OR DEBRIDEMENT  (cpt 84843)  Procedure: Risks, benefits, and alternatives of the procedure were discussed with the patient, and the patient consented to the nasal endoscopy with debridement.  The nasal cavity was sprayed with a topical decongestant and anesthetic . The endoscope was passed into each nostril and each nasal cavity was visualized.  On each side the nasal cavity, sinuses (if open), turbinates, and septum were examined with the findings described below.  Crusting and polypoid material was removed with suction and straight non thru cut forceps.   At the end of the examination, the scope was removed. The patient tolerated the procedure well with no complications.     Endoscopic Sinonasal Exam Findings:  -     Sinuses examined: bilateral maxillary, bilateral ethmoid anterior, bilateral sphenoid and bilateral frontal            The right sinus(es) have mild edema            The left sinus(es) have  mild edema  -     Nasal secretions: small crusts bilaterally; significant decrease  -     Nasal septum: no significant deviation and no perforation appreciated   -     Inferior turbinate: - normal mucosa without significant hypertrophy - bilaterally  -     Middle turbinate: turbinate partially resected on the right  -     Other findings: - no mass or obstructive lesion -    Assessment & Plan:  See today's clinic note.

## 2019-01-29 NOTE — ED NOTES
Called pharmacy regarding Vancomycin, Michael stated he will bring it down as soon as he can. MD notified.

## 2019-01-29 NOTE — ED PROVIDER NOTES
"SCRIBE #1 NOTE: I, Anaid Del, am scribing for, and in the presence of, Milly Moss MD. I have scribed the entire note.      History      Chief Complaint   Patient presents with    General Illness     Pt states, "I have a fever, my body is hurting, headache, weak feeling."       Review of patient's allergies indicates:  No Known Allergies     HPI   HPI    1/28/2019, 9:13 PM   History obtained from the patient      History of Present Illness: Ron Marin is a 33 y.o. female patient with PMHx of sinus surgery (1/8/19) and sinus debridement (1/22/19) who presents to the Emergency Department for evaluation of general illness. Patient states sxs began at 12:00 PM on 1/26/19 after her first dose of Bactrim 6 hours prior (MRSA on nasal culture). Sxs include HA, fever, chills, nausea, generalized weakness, and facial swelling. Patient tried Tylenol with no relief of sxs. Patient went to [facility] on 1/27/19 and was sent to [facility] for increased heart rate. Patient was given IV fluids, vancomycin, and rocefin. Patient had CT of sinuses and lab work done,  Symptoms are constant and moderate in severity. No mitigating or exacerbating factors reported. Patient denies any neck pain, dizziness, nausea vomiting, SOB, CP, palpitations, dysuria, vomiting, diarrhea, constipation, and all other sxs at this time. Prior Tx includes Tylenol, Zofran, and Percocet at 9:45 AM today with no relief of sxs. No further complaints or concerns at this time.       Arrival mode: Personal vehicle    PCP: Provider [facility]       Past Medical History:  Past Medical History:   Diagnosis Date    Environmental allergies        Past Surgical History:  Past Surgical History:   Procedure Laterality Date    SINUS SURGERY           Family History:  History reviewed. No pertinent family history.    Social History:  Social History     Tobacco Use    Smoking status: Never Smoker   Substance and Sexual Activity    " Alcohol use: No     Frequency: Never    Drug use: No    Sexual activity: unknown       ROS   Review of Systems   Constitutional: Positive for chills and fever. Negative for activity change, appetite change, diaphoresis and fatigue.   HENT: Positive for facial swelling. Negative for congestion, ear pain, nosebleeds, rhinorrhea, sinus pain, sore throat and trouble swallowing.    Eyes: Negative for pain and discharge.   Respiratory: Negative for cough, chest tightness, shortness of breath, wheezing and stridor.    Cardiovascular: Negative for chest pain, palpitations and leg swelling.   Gastrointestinal: Positive for nausea. Negative for abdominal distention, abdominal pain, blood in stool, constipation, diarrhea and vomiting.   Genitourinary: Negative for difficulty urinating, dysuria, flank pain, frequency, hematuria and urgency.   Musculoskeletal: Negative for arthralgias, back pain, myalgias and neck pain.   Skin: Negative for pallor, rash and wound.   Neurological: Positive for weakness (generalized) and headaches. Negative for dizziness, syncope, light-headedness and numbness.   Hematological: Does not bruise/bleed easily.   Psychiatric/Behavioral: Negative for confusion and self-injury.   All other systems reviewed and are negative.    Physical Exam      Initial Vitals [01/28/19 1836]   BP Pulse Resp Temp SpO2   121/68 99 20 (!) 100.5 °F (38.1 °C) 98 %      MAP       --          Physical Exam  Nursing Notes and Vital Signs Reviewed.  Constitutional: Patient is in no acute distress. Well-developed and well-nourished. Nontoxic appearing.  Head: Atraumatic. Normocephalic.  Eyes: PERRL. EOM intact. Conjunctivae are not pale. No scleral icterus.  ENT: Mucous membranes are moist. Oropharynx is clear and symmetric. No obvious tenderness over maxillary and frontal sinuses. No nasal drainage appreciated.  No rash or blisters.   Nose: Patent nares. Turbinates are normal. No drainage.   Neck: Supple. Full ROM. No  "lymphadenopathy. No meningeal signs.   Cardiovascular: Tachycardic. Regular rhythm. No murmurs, rubs, or gallops. Distal pulses are 2+ and symmetric.  Pulmonary/Chest: No respiratory distress. Clear to auscultation bilaterally. No wheezing or rales.  Abdominal: Soft and non-distended.  There is no tenderness.  Musculoskeletal: Moves all extremities. No obvious deformities.   Skin: Warm and dry. No rash.   Neurological: Alert, awake, and appropriate. Normal speech. No acute focal neurological deficits are appreciated.  Psychiatric: Normal affect. Good eye contact. Appropriate in content.    ED Course    Procedures  ED Vital Signs:  Vitals:    01/28/19 1836 01/28/19 2130 01/28/19 2215 01/28/19 2300   BP: 121/68 110/64 111/66 (!) 101/58   Pulse: 99 91 91 90   Resp: 20 (!) 21 19 19   Temp: (!) 100.5 °F (38.1 °C)   99 °F (37.2 °C)   TempSrc: Oral   Oral   SpO2: 98% 97% 100% 98%   Weight: 56 kg (123 lb 7.3 oz)      Height: 5' 3" (1.6 m)       01/29/19 0025 01/29/19 0140   BP: 108/79 105/67   Pulse: 80 75   Resp: 15 16   Temp: 98.9 °F (37.2 °C)    TempSrc: Oral    SpO2: 99% 99%   Weight:     Height:         Abnormal Lab Results:  Labs Reviewed   CBC W/ AUTO DIFFERENTIAL - Abnormal; Notable for the following components:       Result Value    Hemoglobin 11.8 (*)     Hematocrit 35.1 (*)     Lymph # 0.7 (*)     Lymph% 13.9 (*)     All other components within normal limits   COMPREHENSIVE METABOLIC PANEL - Abnormal; Notable for the following components:    Sodium 134 (*)     Calcium 8.3 (*)     Albumin 3.1 (*)     Alkaline Phosphatase 46 (*)     All other components within normal limits   URINALYSIS, REFLEX TO URINE CULTURE - Abnormal; Notable for the following components:    Specific Gravity, UA <=1.005 (*)     Ketones, UA 1+ (*)     Occult Blood UA 3+ (*)     Leukocytes, UA 1+ (*)     All other components within normal limits    Narrative:     Preferred Collection Type->Urine, Clean Catch   URINALYSIS MICROSCOPIC - Abnormal; " Notable for the following components:    RBC, UA 5 (*)     WBC, UA 10 (*)     All other components within normal limits    Narrative:     Preferred Collection Type->Urine, Clean Catch   INFLUENZA A & B BY MOLECULAR   CULTURE, BLOOD   CULTURE, BLOOD   LACTIC ACID, PLASMA   PROCALCITONIN   PREGNANCY TEST, URINE RAPID        All Lab Results:  Results for orders placed or performed during the hospital encounter of 01/28/19   Influenza A & B by Molecular   Result Value Ref Range    Influenza A, Molecular Negative Negative    Influenza B, Molecular Negative Negative    Flu A & B Source Nasal swab    CBC auto differential   Result Value Ref Range    WBC 5.04 3.90 - 12.70 K/uL    RBC 4.18 4.00 - 5.40 M/uL    Hemoglobin 11.8 (L) 12.0 - 16.0 g/dL    Hematocrit 35.1 (L) 37.0 - 48.5 %    MCV 84 82 - 98 fL    MCH 28.2 27.0 - 31.0 pg    MCHC 33.6 32.0 - 36.0 g/dL    RDW 12.6 11.5 - 14.5 %    Platelets 185 150 - 350 K/uL    MPV 9.5 9.2 - 12.9 fL    Gran # (ANC) 3.7 1.8 - 7.7 K/uL    Lymph # 0.7 (L) 1.0 - 4.8 K/uL    Mono # 0.4 0.3 - 1.0 K/uL    Eos # 0.3 0.0 - 0.5 K/uL    Baso # 0.01 0.00 - 0.20 K/uL    Gran% 73.0 38.0 - 73.0 %    Lymph% 13.9 (L) 18.0 - 48.0 %    Mono% 7.3 4.0 - 15.0 %    Eosinophil% 5.6 0.0 - 8.0 %    Basophil% 0.2 0.0 - 1.9 %    Differential Method Automated    Comprehensive metabolic panel   Result Value Ref Range    Sodium 134 (L) 136 - 145 mmol/L    Potassium 3.8 3.5 - 5.1 mmol/L    Chloride 100 95 - 110 mmol/L    CO2 24 23 - 29 mmol/L    Glucose 98 70 - 110 mg/dL    BUN, Bld 6 6 - 20 mg/dL    Creatinine 0.9 0.5 - 1.4 mg/dL    Calcium 8.3 (L) 8.7 - 10.5 mg/dL    Total Protein 6.7 6.0 - 8.4 g/dL    Albumin 3.1 (L) 3.5 - 5.2 g/dL    Total Bilirubin 0.3 0.1 - 1.0 mg/dL    Alkaline Phosphatase 46 (L) 55 - 135 U/L    AST 21 10 - 40 U/L    ALT 23 10 - 44 U/L    Anion Gap 10 8 - 16 mmol/L    eGFR if African American >60 >60 mL/min/1.73 m^2    eGFR if non African American >60 >60 mL/min/1.73 m^2   Lactic acid, plasma  #1   Result Value Ref Range    Lactate (Lactic Acid) 0.9 0.5 - 2.2 mmol/L   Urinalysis, Reflex to Urine Culture Urine, Clean Catch   Result Value Ref Range    Specimen UA Urine, Clean Catch     Color, UA Yellow Yellow, Straw, Paige    Appearance, UA Clear Clear    pH, UA 6.0 5.0 - 8.0    Specific Gravity, UA <=1.005 (A) 1.005 - 1.030    Protein, UA Negative Negative    Glucose, UA Negative Negative    Ketones, UA 1+ (A) Negative    Bilirubin (UA) Negative Negative    Occult Blood UA 3+ (A) Negative    Nitrite, UA Negative Negative    Urobilinogen, UA Negative <2.0 EU/dL    Leukocytes, UA 1+ (A) Negative   Procalcitonin   Result Value Ref Range    Procalcitonin 0.23 <0.25 ng/mL   Pregnancy, urine rapid (UPT)   Result Value Ref Range    Preg Test, Ur Negative    Urinalysis Microscopic   Result Value Ref Range    RBC, UA 5 (H) 0 - 4 /hpf    WBC, UA 10 (H) 0 - 5 /hpf    Bacteria, UA Rare None-Occ /hpf    Squam Epithel, UA 5 /hpf    Microscopic Comment SEE COMMENT        Imaging Results:  Imaging Results          CT Head W Wo Contrast (Final result)  Result time 01/28/19 23:43:00    Final result by Jordana Vivas MD (01/28/19 23:43:00)                 Impression:      No acute intracranial abnormality.    Nearly completely opacified paranasal sinuses.    All CT scans at this facility use dose modulation, iterative reconstruction and/or weight based dosing when appropriate to reduce radiation dose to as low as reasonably achievable.      Electronically signed by: Jordana Vivas MD  Date:    01/28/2019  Time:    23:43             Narrative:    EXAMINATION:  CT HEAD WITH AND WITHOUT    CLINICAL HISTORY:  headache with fever post sinus surgery;    TECHNIQUE:  Low dose axial CT images obtained throughout the head without intravenous contrast. Sagittal and coronal reconstructions were performed.    COMPARISON:  None.    FINDINGS:  Intracranial compartment:    Ventricles and sulci are normal in size for age  without evidence of hydrocephalus. No extra-axial blood or fluid collections.    The brain parenchyma appears normal. No parenchymal mass, hemorrhage, edema or major vascular distribution infarct.  There is no abnormal enhancement.    Skull/extracranial contents (limited evaluation): No fracture or aggressive appearing osseous lesion..  Paranasal sinuses are nearly completely opacified.  Mastoid air cells are clear.                                        The Emergency Provider reviewed the vital signs and test results, which are outlined above.    ED Discussion     11:52 PM: Discussed with pt all pertinent ED information and results. Has follow up appointment with Dr. Carbone tomorrow at 9am, clincially non-toxic appearing, I do not feel she has meningitis.  Discussed pt dx of chronic sinusitis and plan of tx. Gave pt all f/u and return to the ED instructions. All questions and concerns were addressed at this time. Pt expresses understanding of information and instructions, and is comfortable with plan to discharge. Pt is stable for discharge.    I discussed with patient and/or family/caretaker that evaluation in the ED does not suggest any emergent or life threatening medical conditions requiring immediate intervention beyond what was provided in the ED, and I believe patient is safe for discharge.  Regardless, an unremarkable evaluation in the ED does not preclude the development or presence of a serious of life threatening condition. As such, patient was instructed to return immediately for any worsening or change in current symptoms.    ED Medication(s):  Medications   sodium chloride 0.9% bolus 1,680 mL (0 mL/kg × 56 kg Intravenous Stopped 1/29/19 0035)   acetaminophen (10 mg/mL) injection 1,000 mg (0 mg Intravenous Stopped 1/28/19 2231)   vancomycin 750 mg in dextrose 5 % 250 mL IVPB (ready to mix system) (0 mg/kg × 56 kg Intravenous Stopped 1/29/19 0145)   omnipaque 350 iohexol 75 mL (75 mLs Intravenous Given  1/28/19 2332)       Follow-up Information     Toni Carbone MD On 1/29/2019.    Specialty:  Otolaryngology  Why:  at 9am Return to the Emergenyc Room, If symptoms worsen  Contact information:  [address]  [place] [address]  [phone]                   Current Discharge Medication List          Medical Decision Making    Medical Decision Making:   Clinical Tests:   Lab Tests: Ordered and Reviewed  Radiological Study: Ordered and Reviewed           Scribe Attestation:   Scribe #1: I performed the above scribed service and the documentation accurately describes the services I performed. I attest to the accuracy of the note.    Attending:   Physician Attestation Statement for Scribe #1: I, Milly Moss MD, personally performed the services described in this documentation, as scribed by Anaid Mathis, in my presence, and it is both accurate and complete.          Clinical Impression       ICD-10-CM ICD-9-CM   1. Chronic sinusitis, unspecified location J32.9 473.9   2. 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

## 2019-02-03 LAB
BACTERIA BLD CULT: NORMAL
BACTERIA BLD CULT: NORMAL

## 2019-02-12 ENCOUNTER — OFFICE VISIT (OUTPATIENT)
Dept: OTOLARYNGOLOGY | Facility: CLINIC | Age: 34
End: 2019-02-12
Payer: COMMERCIAL

## 2019-02-12 VITALS — WEIGHT: 118 LBS | HEIGHT: 63 IN | BODY MASS INDEX: 20.91 KG/M2

## 2019-02-12 DIAGNOSIS — J32.4 CHRONIC PANSINUSITIS: Primary | ICD-10-CM

## 2019-02-12 PROCEDURE — 99024 POSTOP FOLLOW-UP VISIT: CPT | Mod: S$GLB,,, | Performed by: OTOLARYNGOLOGY

## 2019-02-12 PROCEDURE — 99999 PR PBB SHADOW E&M-EST. PATIENT-LVL III: CPT | Mod: PBBFAC,,, | Performed by: OTOLARYNGOLOGY

## 2019-02-12 PROCEDURE — 99024 PR POST-OP FOLLOW-UP VISIT: ICD-10-PCS | Mod: S$GLB,,, | Performed by: OTOLARYNGOLOGY

## 2019-02-12 PROCEDURE — 99999 PR PBB SHADOW E&M-EST. PATIENT-LVL III: ICD-10-PCS | Mod: PBBFAC,,, | Performed by: OTOLARYNGOLOGY

## 2019-02-12 NOTE — PROGRESS NOTES
"Subjective:   Patient: Ron Marin [id number], :1985   Visit date:2019 2:27 PM    Chief Complaint: Status post sinus surgery    HPI:  Ron is a 33 y.o. female with Chronic sinusitis.    Subjective:  Overall feeling great.  No drainage, pressure or pain.        Surgery: 2019    Sinuses operated/OR findings:   Balloon dilation of bilateral frontal, maxillary, sphenoid  Bilateral partial ethmoidectomies  Solitario SMR turbinates    Susceptibility      Methicillin resistant staphylococcus aureus     CULTURE, AEROBIC  (SPECIFY SOURCE)     Clindamycin >4  Resistant     Erythromycin >4  Resistant     Oxacillin >2  Resistant     Penicillin >8  Resistant     Tetracycline <=4  Sensitive     Trimeth/Sulfa <=0.5/9.5  Sensitive     Vancomycin 1  Sensitive            Linear View       Review of Systems:  -     Allergic/Immunologic: is allergic to bactrim [sulfamethoxazole-trimethoprim]..  -     Constitutional: Current temp:  afebrile    Her meds, allergies, medical, surgical, social & family histories were reviewed & updated:  -     She has a current medication list which includes the following prescription(s): allergy mix, azelastine, cetirizine hcl, fish oil-omega-3 fatty acids, fluticasone, lactobacillus rhamnosus gg, levocetirizine, levonorgest/eth.estradiol/iron, linezolid, linezolid, montelukast, and multivitamin.  -     She  has a past medical history of Environmental allergies.   -     She does not have any pertinent problems on file.   -     She  has a past surgical history that includes Sinus surgery.  -     She  reports that  has never smoked. She does not have any smokeless tobacco history on file. She reports that she does not drink alcohol or use drugs.  -     Her family history is not on file.  -     She is allergic to bactrim [sulfamethoxazole-trimethoprim].    Objective:   Physical Exam:  Vitals:  Ht 5' 3" (1.6 m)   Wt 53.5 kg (118 lb)   BMI 20.90 kg/m²   General appearance:  Well " developed, well nourished    Eyes:  Extraocular motions intact, PERRL    Communication:  no hoarseness, no dysphonia    Ears:  Otoscopy of external auditory canals and tympanic membranes was normal, clinical speech reception thresholds grossly intact, no mass/lesion of auricle.  Nose:  No masses/lesions of external nose, nasal mucosa, septum, and turbinates were within normal limits.  Mouth:  No mass/lesion of lips, teeth, gums, hard/soft palate, tongue, tonsils, or oropharynx.    Cardiovascular:  No pedal edema; Radial Pulses +2     Neck & Lymphatics:  No cervical lymphadenopathy, no neck mass/crepitus/ asymmetry, trachea is midline, no thyroid enlargement/tenderness/mass.    Psych: Oriented x3,  Alert with normal mood and affect.     Respiration/Chest:  Symmetric expansion during respiration, normal respiratory effort.    Skin:  Warm and intact. No ulcerations of face, scalp, neck.      Assessment & Plan:   Continue allergy mgmt  Complete zyvox and vanc irrigations        NASAL ENDOSCOPY WITH POLYPECTOMY &/OR DEBRIDEMENT  (cpt 32602)  Procedure: Risks, benefits, and alternatives of the procedure were discussed with the patient, and the patient consented to the nasal endoscopy with debridement.  The nasal cavity was sprayed with a topical decongestant and anesthetic . The endoscope was passed into each nostril and each nasal cavity was visualized.  On each side the nasal cavity, sinuses (if open), turbinates, and septum were examined with the findings described below.  Crusting and polypoid material was removed with suction and straight non thru cut forceps.   At the end of the examination, the scope was removed. The patient tolerated the procedure well with no complications.     Endoscopic Sinonasal Exam Findings:  -     Sinuses examined: bilateral maxillary, bilateral ethmoid anterior, bilateral sphenoid and bilateral frontal            The right sinus(es) have normal mucosa            The left sinus(es) have  mild edema  -     Nasal secretions: none  -     Nasal septum: no significant deviation and no perforation appreciated   -     Inferior turbinate: - normal mucosa without significant hypertrophy - bilaterally  -     Middle turbinate: turbinate partially resected on the right  -     Other findings: - no mass or obstructive lesion -    Assessment & Plan:  See today's clinic note.

## 2019-03-08 ENCOUNTER — PATIENT MESSAGE (OUTPATIENT)
Dept: OTOLARYNGOLOGY | Facility: CLINIC | Age: 34
End: 2019-03-08

## 2019-04-05 RX ORDER — BENZONATATE 100 MG/1
100 CAPSULE ORAL 3 TIMES DAILY PRN
Qty: 90 CAPSULE | Refills: 2 | Status: SHIPPED | OUTPATIENT
Start: 2019-04-05 | End: 2019-05-05

## 2019-04-22 RX ORDER — MONTELUKAST SODIUM 10 MG/1
10 TABLET ORAL DAILY
Qty: 90 TABLET | Refills: 0 | Status: SHIPPED | OUTPATIENT
Start: 2019-04-22 | End: 2019-06-03 | Stop reason: SDUPTHER

## 2019-06-02 RX ORDER — CIPROFLOXACIN 500 MG/1
TABLET ORAL
Qty: 7 TABLET | Refills: 0 | Status: SHIPPED | OUTPATIENT
Start: 2019-06-02 | End: 2019-08-22

## 2019-06-02 RX ORDER — CIPROFLOXACIN 500 MG/1
500 TABLET ORAL EVERY 12 HOURS
Qty: 14 TABLET | Refills: 0 | Status: SHIPPED | OUTPATIENT
Start: 2019-06-02 | End: 2019-06-02 | Stop reason: SDUPTHER

## 2019-06-03 ENCOUNTER — OFFICE VISIT (OUTPATIENT)
Dept: INTERNAL MEDICINE | Facility: CLINIC | Age: 34
End: 2019-06-03
Payer: COMMERCIAL

## 2019-06-03 ENCOUNTER — LAB VISIT (OUTPATIENT)
Dept: LAB | Facility: HOSPITAL | Age: 34
End: 2019-06-03
Payer: COMMERCIAL

## 2019-06-03 VITALS
BODY MASS INDEX: 22.27 KG/M2 | HEIGHT: 63 IN | DIASTOLIC BLOOD PRESSURE: 74 MMHG | WEIGHT: 125.69 LBS | TEMPERATURE: 97 F | OXYGEN SATURATION: 96 % | HEART RATE: 70 BPM | SYSTOLIC BLOOD PRESSURE: 102 MMHG

## 2019-06-03 DIAGNOSIS — R31.0 GROSS HEMATURIA: ICD-10-CM

## 2019-06-03 DIAGNOSIS — R31.0 GROSS HEMATURIA: Primary | ICD-10-CM

## 2019-06-03 LAB
BACTERIA #/AREA URNS HPF: ABNORMAL /HPF
BILIRUB UR QL STRIP: NEGATIVE
CLARITY UR: CLEAR
COLOR UR: YELLOW
GLUCOSE UR QL STRIP: NEGATIVE
HGB UR QL STRIP: ABNORMAL
KETONES UR QL STRIP: NEGATIVE
LEUKOCYTE ESTERASE UR QL STRIP: ABNORMAL
MICROSCOPIC COMMENT: ABNORMAL
NITRITE UR QL STRIP: NEGATIVE
PH UR STRIP: 7 [PH] (ref 5–8)
PROT UR QL STRIP: NEGATIVE
RBC #/AREA URNS HPF: 40 /HPF (ref 0–4)
SP GR UR STRIP: 1.01 (ref 1–1.03)
SQUAMOUS #/AREA URNS HPF: 25 /HPF
URN SPEC COLLECT METH UR: ABNORMAL
WBC #/AREA URNS HPF: 15 /HPF (ref 0–5)

## 2019-06-03 PROCEDURE — 87077 CULTURE AEROBIC IDENTIFY: CPT

## 2019-06-03 PROCEDURE — 99999 PR PBB SHADOW E&M-EST. PATIENT-LVL IV: CPT | Mod: PBBFAC,,, | Performed by: PHYSICIAN ASSISTANT

## 2019-06-03 PROCEDURE — 87186 SC STD MICRODIL/AGAR DIL: CPT

## 2019-06-03 PROCEDURE — 99999 PR PBB SHADOW E&M-EST. PATIENT-LVL IV: ICD-10-PCS | Mod: PBBFAC,,, | Performed by: PHYSICIAN ASSISTANT

## 2019-06-03 PROCEDURE — 81000 URINALYSIS NONAUTO W/SCOPE: CPT

## 2019-06-03 PROCEDURE — 3008F BODY MASS INDEX DOCD: CPT | Mod: CPTII,S$GLB,, | Performed by: PHYSICIAN ASSISTANT

## 2019-06-03 PROCEDURE — 87088 URINE BACTERIA CULTURE: CPT

## 2019-06-03 PROCEDURE — 87086 URINE CULTURE/COLONY COUNT: CPT

## 2019-06-03 PROCEDURE — 99214 OFFICE O/P EST MOD 30 MIN: CPT | Mod: S$GLB,,, | Performed by: PHYSICIAN ASSISTANT

## 2019-06-03 PROCEDURE — 3008F PR BODY MASS INDEX (BMI) DOCUMENTED: ICD-10-PCS | Mod: CPTII,S$GLB,, | Performed by: PHYSICIAN ASSISTANT

## 2019-06-03 PROCEDURE — 99214 PR OFFICE/OUTPT VISIT, EST, LEVL IV, 30-39 MIN: ICD-10-PCS | Mod: S$GLB,,, | Performed by: PHYSICIAN ASSISTANT

## 2019-06-03 NOTE — PROGRESS NOTES
Patient called with UTI ssx as adverse s/e from chronic abx for sinusitis, issued Ciprofloxacin which has worked for her in the past. Patient reported long hx of UTI's with any abx use in the past. ssx included incontinence, burning, and increased frequency.

## 2019-06-03 NOTE — PROGRESS NOTES
Subjective:      Patient ID: Ron Marin is a 33 y.o. female.    Chief Complaint: Urinary Tract Infection and Hematuria    Urinary Frequency    This is a new problem. The current episode started yesterday. The problem occurs every urination. The problem has been gradually improving. The quality of the pain is described as aching. She is sexually active. There is no history of pyelonephritis. Associated symptoms include frequency, hematuria and urgency. Pertinent negatives include no behavior changes, chills, discharge, flank pain, hesitancy, nausea, possible pregnancy, sweats, vomiting, weight loss, bubble bath use, constipation, rash or withholding. She has tried nothing for the symptoms. The treatment provided no relief. There is no history of catheterization, diabetes insipidus, diabetes mellitus, genitourinary reflux, hypertension, kidney stones, recurrent UTIs, a single kidney, STD, urinary stasis or a urological procedure.       Review of Systems   Constitutional: Negative for activity change, appetite change, chills, diaphoresis, fatigue, fever, unexpected weight change and weight loss.   HENT: Negative.  Negative for congestion, hearing loss, postnasal drip, rhinorrhea, sore throat, trouble swallowing and voice change.    Eyes: Negative.  Negative for visual disturbance.   Respiratory: Negative.  Negative for cough, choking, chest tightness and shortness of breath.    Cardiovascular: Negative for chest pain, palpitations and leg swelling.   Gastrointestinal: Negative for abdominal distention, abdominal pain, blood in stool, constipation, diarrhea, nausea and vomiting.   Endocrine: Negative for cold intolerance, heat intolerance, polydipsia and polyuria.   Genitourinary: Positive for dysuria, frequency, hematuria and urgency. Negative for decreased urine volume, difficulty urinating, dyspareunia, enuresis, flank pain, genital sores, hesitancy, menstrual problem, pelvic pain, vaginal bleeding,  "vaginal discharge and vaginal pain.   Musculoskeletal: Negative for arthralgias, back pain, gait problem, joint swelling and myalgias.   Skin: Negative for color change, pallor, rash and wound.   Neurological: Negative for dizziness, tremors, weakness, light-headedness, numbness and headaches.   Hematological: Negative for adenopathy.   Psychiatric/Behavioral: Negative for behavioral problems, confusion, self-injury, sleep disturbance and suicidal ideas. The patient is not nervous/anxious.      Objective:   /74 (BP Location: Left arm, Patient Position: Sitting)   Pulse 70   Temp 96.8 °F (36 °C) (Tympanic)   Ht 5' 3" (1.6 m)   Wt 57 kg (125 lb 10.6 oz)   SpO2 96%   BMI 22.26 kg/m²     Physical Exam   Constitutional: She is oriented to person, place, and time. She appears well-developed and well-nourished. No distress.   HENT:   Head: Normocephalic and atraumatic.   Right Ear: External ear normal.   Left Ear: External ear normal.   Nose: Nose normal.   Mouth/Throat: Oropharynx is clear and moist.   Eyes: Pupils are equal, round, and reactive to light. Conjunctivae and EOM are normal.   Neck: Normal range of motion.   Cardiovascular: Normal rate, regular rhythm and normal heart sounds. Exam reveals no gallop and no friction rub.   No murmur heard.  Pulmonary/Chest: Effort normal and breath sounds normal. No respiratory distress. She has no wheezes. She has no rales. She exhibits no tenderness.   Abdominal: Soft. Bowel sounds are normal. She exhibits no distension, no pulsatile liver, no fluid wave, no ascites, no pulsatile midline mass and no mass. There is no hepatosplenomegaly, splenomegaly or hepatomegaly. There is tenderness in the suprapubic area. There is no rigidity, no rebound, no guarding and no CVA tenderness.   Musculoskeletal: Normal range of motion.   Neurological: She is alert and oriented to person, place, and time.   Skin: Skin is warm. No rash noted. She is not diaphoretic.   Psychiatric: " She has a normal mood and affect. Her behavior is normal. Judgment and thought content normal.   Nursing note and vitals reviewed.      Assessment:     1. Gross hematuria      Plan:   Gross hematuria  -     Urinalysis; Future; Expected date: 06/03/2019  -     Urine culture; Future    -prescribed cipro, has not started the medication yet.   -increase fluids  -cranberry pills    Follow up if symptoms worsen or fail to improve.

## 2019-06-05 LAB — BACTERIA UR CULT: NORMAL

## 2019-06-27 RX ORDER — MONTELUKAST SODIUM 10 MG/1
10 TABLET ORAL DAILY
Qty: 30 TABLET | Refills: 11 | Status: SHIPPED | OUTPATIENT
Start: 2019-06-27 | End: 2019-07-27

## 2019-08-22 ENCOUNTER — OFFICE VISIT (OUTPATIENT)
Dept: OTOLARYNGOLOGY | Facility: CLINIC | Age: 34
End: 2019-08-22
Payer: COMMERCIAL

## 2019-08-22 ENCOUNTER — LAB VISIT (OUTPATIENT)
Dept: LAB | Facility: HOSPITAL | Age: 34
End: 2019-08-22
Attending: PHYSICIAN ASSISTANT
Payer: COMMERCIAL

## 2019-08-22 VITALS — HEIGHT: 63 IN | WEIGHT: 124.13 LBS | TEMPERATURE: 97 F | BODY MASS INDEX: 21.99 KG/M2

## 2019-08-22 DIAGNOSIS — R53.82 CHRONIC FATIGUE: Primary | ICD-10-CM

## 2019-08-22 DIAGNOSIS — R53.82 CHRONIC FATIGUE: ICD-10-CM

## 2019-08-22 DIAGNOSIS — J30.89 NON-SEASONAL ALLERGIC RHINITIS, UNSPECIFIED TRIGGER: ICD-10-CM

## 2019-08-22 DIAGNOSIS — J32.0 CHRONIC MAXILLARY SINUSITIS: ICD-10-CM

## 2019-08-22 LAB
25(OH)D3+25(OH)D2 SERPL-MCNC: 42 NG/ML (ref 30–96)
CA-I BLDV-SCNC: 1.13 MMOL/L (ref 1.06–1.42)
ESTRADIOL SERPL-MCNC: 58 PG/ML
FSH SERPL-ACNC: 7.3 MIU/ML
PTH-INTACT SERPL-MCNC: 43 PG/ML (ref 9–77)
TESTOST SERPL-MCNC: 9 NG/DL (ref 5–73)
TSH SERPL DL<=0.005 MIU/L-ACNC: 0.76 UIU/ML (ref 0.4–4)

## 2019-08-22 PROCEDURE — 84403 ASSAY OF TOTAL TESTOSTERONE: CPT

## 2019-08-22 PROCEDURE — 83970 ASSAY OF PARATHORMONE: CPT

## 2019-08-22 PROCEDURE — 99024 PR POST-OP FOLLOW-UP VISIT: ICD-10-PCS | Mod: S$GLB,,, | Performed by: PHYSICIAN ASSISTANT

## 2019-08-22 PROCEDURE — 36415 COLL VENOUS BLD VENIPUNCTURE: CPT

## 2019-08-22 PROCEDURE — 83001 ASSAY OF GONADOTROPIN (FSH): CPT

## 2019-08-22 PROCEDURE — 99024 POSTOP FOLLOW-UP VISIT: CPT | Mod: S$GLB,,, | Performed by: PHYSICIAN ASSISTANT

## 2019-08-22 PROCEDURE — 82330 ASSAY OF CALCIUM: CPT

## 2019-08-22 PROCEDURE — 82670 ASSAY OF TOTAL ESTRADIOL: CPT

## 2019-08-22 PROCEDURE — 84443 ASSAY THYROID STIM HORMONE: CPT

## 2019-08-22 PROCEDURE — 82306 VITAMIN D 25 HYDROXY: CPT

## 2019-08-22 PROCEDURE — 99999 PR PBB SHADOW E&M-EST. PATIENT-LVL III: CPT | Mod: PBBFAC,,, | Performed by: PHYSICIAN ASSISTANT

## 2019-08-22 PROCEDURE — 99999 PR PBB SHADOW E&M-EST. PATIENT-LVL III: ICD-10-PCS | Mod: PBBFAC,,, | Performed by: PHYSICIAN ASSISTANT

## 2019-08-22 RX ORDER — AZELASTINE 1 MG/ML
1 SPRAY, METERED NASAL 2 TIMES DAILY
Qty: 30 ML | Refills: 11 | Status: SHIPPED | OUTPATIENT
Start: 2019-08-22 | End: 2020-07-31 | Stop reason: SDUPTHER

## 2019-08-22 RX ORDER — AMOXICILLIN AND CLAVULANATE POTASSIUM 875; 125 MG/1; MG/1
1 TABLET, FILM COATED ORAL EVERY 12 HOURS
Qty: 20 TABLET | Refills: 0 | Status: SHIPPED | OUTPATIENT
Start: 2019-08-22 | End: 2019-09-01

## 2019-08-22 NOTE — PROGRESS NOTES
"Subjective:   Patient: Ron Marin [id number], :1985   Visit date:2019 10:03 AM    Chief Complaint:  Other (sinus pain and pressure.)    HPI:  Ron is a 33 y.o. female who is here for follow-up. She underwent balloon sinuplasty on 19. Currently takes Singulair, Xyzal, Astelin, Flonase, and is on SLIT for allergies. C/o new onset of green drainage and congestion. Increased fatigue. Unknown hx of thyroid/hormone issues or imbalance. No fevers. No other complaints.        Technical Procedures Used:      bilateral balloon maxillary sinuplasty  bilateral partial ethmoidectomy  bilateral balloon sphenoid dilation  right balloon frontal sinus dilation    Review of Systems:  -     Allergic/Immunologic: is allergic to bactrim [sulfamethoxazole-trimethoprim]..  -     Constitutional: Current temp: 96.7 °F (35.9 °C) (Tympanic)    Her meds, allergies, medical, surgical, social & family histories were reviewed & updated:  -     She has a current medication list which includes the following prescription(s): allergy mix, azelastine, fish oil-omega-3 fatty acids, fluticasone propionate, lactobacillus rhamnosus gg, levocetirizine, multivitamin, amoxicillin-clavulanate 875-125mg, and azelastine.  -     She  has a past medical history of Environmental allergies.   -     She does not have any pertinent problems on file.   -     She  has a past surgical history that includes Sinus surgery.  -     She  reports that she has never smoked. She does not have any smokeless tobacco history on file. She reports that she does not drink alcohol or use drugs.  -     Her family history is not on file.  -     She is allergic to bactrim [sulfamethoxazole-trimethoprim].    Objective:     Physical Exam:  Vitals:  Temp 96.7 °F (35.9 °C) (Tympanic)   Ht 5' 3" (1.6 m)   Wt 56.3 kg (124 lb 1.9 oz)   BMI 21.99 kg/m²   Appearance:  Well-developed, well-nourished.  Communication:  Able to communicate, no hoarseness.  Head & Face:  " Normocephalic, atraumatic, no sinus tenderness, normal facial strength.  Eyes:  Extraocular motions intact.  Ears:  Otoscopy of external auditory canals and tympanic membranes was normal, clinical speech reception thresholds grossly intact, no mass/lesion of auricle.  Nose:  See endoscopic exam  Mouth:  No mass/lesion of lips, teeth, gums, hard/soft palate, tongue, tonsils, or oropharynx.  Neck & Lymphatics:  No cervical lymphadenopathy, no neck mass/crepitus/ asymmetry, trachea is midline, no thyroid enlargement/tenderness/mass.  Neuro/Psych: Alert with normal mood and affect.   Abdominal: Normal appearance.   Respiration/Chest:  Symmetric expansion during respiration, normal respiratory effort.  Skin:  Warm and intact  Cardiovascular:  No peripheral vascular edema or varicosities.    Assessment & Plan:   Ron was seen today for other.    Diagnoses and all orders for this visit:    Chronic fatigue  -     TSH; Future  -     ESTRADIOL; Future  -     TESTOSTERONE; Future  -     FOLLICLE STIMULATING HORMONE; Future  -     CALCIUM, IONIZED; Future  -     VITAMIN D; Future  -     PTH, intact; Future    Chronic maxillary sinusitis    Non-seasonal allergic rhinitis, unspecified trigger    Other orders  -     amoxicillin-clavulanate 875-125mg (AUGMENTIN) 875-125 mg per tablet; Take 1 tablet by mouth every 12 (twelve) hours. for 10 days  -     azelastine (ASTELIN) 137 mcg (0.1 %) nasal spray; 1 spray (137 mcg total) by Nasal route 2 (two) times daily.    Augmentin x 10 days. Continue allergy medications.   Labs, I will contact pt with results.       Katiana Henry PA-C  [facility]   [facility]  [address]  [place] [address]  P: [phone]  F: [phone]    Patient: Ron Marin [id number], :1985  Procedure date:2019  Patient's medications, allergies, past medical, surgical, social and family histories were reviewed and updated as appropriate.  Chief  Complaint:  Other (sinus pain and pressure.)    HPI:  Ron is a 33 y.o. female with chronic sinusitis.      Procedure: Risks, benefits, and alternatives of the procedure were discussed with the patient, and the patient consented to the nasal endoscopy.  The nasal cavity was sprayed with a topical decongestant and anesthetic (if needed). The endoscope was passed into each nostril and each nasal cavity was visualized.  On each side the nasal cavity, sinuses (if open), turbinates, and septum were examined with the findings described below. At the end of the examination, the scope was removed. The patient tolerated the procedure well with no complications.     Endoscopic Sinonasal Exam Findings:  -     Sinuses examined: bilateral maxillary and bilateral ethmoid anterior/posterior            The right sinus(es) have mild edema            The left sinus(es) have mild edema  -     Nasal secretions: purulent secretions on the left  -     Nasal septum: no significant deviation and no perforation appreciated   -     Inferior turbinate: - normal mucosa without significant hypertrophy - bilaterally  -     Middle turbinate: - normal mucosa without significant hypertrophy - bilaterally  -     Other findings: - no mass or obstructive lesion - no polyps    Assessment & Plan:  See today's clinic note

## 2020-05-19 RX ORDER — CHLORZOXAZONE 500 MG/1
500 TABLET ORAL 3 TIMES DAILY PRN
Qty: 90 TABLET | Refills: 2 | Status: SHIPPED | OUTPATIENT
Start: 2020-05-19 | End: 2020-06-18

## 2020-07-17 DIAGNOSIS — T78.40XA ALLERGIC STATE, INITIAL ENCOUNTER: Primary | ICD-10-CM

## 2020-07-17 RX ORDER — MONTELUKAST SODIUM 10 MG/1
10 TABLET ORAL DAILY
Qty: 30 TABLET | Refills: 2 | Status: SHIPPED | OUTPATIENT
Start: 2020-07-17 | End: 2020-07-31 | Stop reason: SDUPTHER

## 2020-07-29 ENCOUNTER — TELEPHONE (OUTPATIENT)
Dept: OTOLARYNGOLOGY | Facility: CLINIC | Age: 35
End: 2020-07-29

## 2020-07-29 ENCOUNTER — LAB VISIT (OUTPATIENT)
Dept: OTOLARYNGOLOGY | Facility: CLINIC | Age: 35
End: 2020-07-29
Payer: COMMERCIAL

## 2020-07-29 DIAGNOSIS — Z01.818 PRE-PROCEDURAL EXAMINATION: Primary | ICD-10-CM

## 2020-07-29 DIAGNOSIS — Z01.818 PRE-PROCEDURAL EXAMINATION: ICD-10-CM

## 2020-07-29 PROCEDURE — U0003 INFECTIOUS AGENT DETECTION BY NUCLEIC ACID (DNA OR RNA); SEVERE ACUTE RESPIRATORY SYNDROME CORONAVIRUS 2 (SARS-COV-2) (CORONAVIRUS DISEASE [COVID-19]), AMPLIFIED PROBE TECHNIQUE, MAKING USE OF HIGH THROUGHPUT TECHNOLOGIES AS DESCRIBED BY CMS-2020-01-R: HCPCS

## 2020-07-29 NOTE — TELEPHONE ENCOUNTER
----- Message from Nav Melissa sent at 7/29/2020 10:27 AM CDT -----  Regarding: pt  .Type:  Patient Returning Call    Who Called:pt   Who Left Message for Patient:unsure   Does the patient know what this is regarding?:yes   Would the patient rather a call back or a response via [facility]? Call back   Best Call Back Number:.[phone] (home)   Additional Information:     Thank you,   Nav Melissa

## 2020-07-30 LAB — SARS-COV-2 RNA RESP QL NAA+PROBE: NOT DETECTED

## 2020-07-31 ENCOUNTER — OFFICE VISIT (OUTPATIENT)
Dept: OTOLARYNGOLOGY | Facility: CLINIC | Age: 35
End: 2020-07-31
Payer: COMMERCIAL

## 2020-07-31 VITALS — WEIGHT: 124.31 LBS | HEIGHT: 63 IN | BODY MASS INDEX: 22.03 KG/M2 | TEMPERATURE: 98 F

## 2020-07-31 DIAGNOSIS — B96.89 ACUTE BACTERIAL RHINOSINUSITIS: Primary | ICD-10-CM

## 2020-07-31 DIAGNOSIS — J01.90 ACUTE BACTERIAL RHINOSINUSITIS: Primary | ICD-10-CM

## 2020-07-31 PROCEDURE — 31231 PR NASAL ENDOSCOPY, DX: ICD-10-PCS | Mod: S$GLB,,, | Performed by: OTOLARYNGOLOGY

## 2020-07-31 PROCEDURE — 3008F BODY MASS INDEX DOCD: CPT | Mod: CPTII,S$GLB,, | Performed by: OTOLARYNGOLOGY

## 2020-07-31 PROCEDURE — 99214 PR OFFICE/OUTPT VISIT, EST, LEVL IV, 30-39 MIN: ICD-10-PCS | Mod: 25,S$GLB,, | Performed by: OTOLARYNGOLOGY

## 2020-07-31 PROCEDURE — 99999 PR PBB SHADOW E&M-EST. PATIENT-LVL III: ICD-10-PCS | Mod: PBBFAC,,, | Performed by: OTOLARYNGOLOGY

## 2020-07-31 PROCEDURE — 3008F PR BODY MASS INDEX (BMI) DOCUMENTED: ICD-10-PCS | Mod: CPTII,S$GLB,, | Performed by: OTOLARYNGOLOGY

## 2020-07-31 PROCEDURE — 99999 PR PBB SHADOW E&M-EST. PATIENT-LVL III: CPT | Mod: PBBFAC,,, | Performed by: OTOLARYNGOLOGY

## 2020-07-31 PROCEDURE — 99214 OFFICE O/P EST MOD 30 MIN: CPT | Mod: 25,S$GLB,, | Performed by: OTOLARYNGOLOGY

## 2020-07-31 PROCEDURE — 31231 NASAL ENDOSCOPY DX: CPT | Mod: S$GLB,,, | Performed by: OTOLARYNGOLOGY

## 2020-07-31 RX ORDER — PREDNISONE 20 MG/1
TABLET ORAL
Qty: 21 TABLET | Refills: 0 | Status: SHIPPED | OUTPATIENT
Start: 2020-07-31 | End: 2021-02-18 | Stop reason: SDUPTHER

## 2020-07-31 RX ORDER — LEVOFLOXACIN 500 MG/1
500 TABLET, FILM COATED ORAL DAILY
Qty: 14 TABLET | Refills: 0 | Status: SHIPPED | OUTPATIENT
Start: 2020-07-31 | End: 2020-08-14

## 2020-07-31 RX ORDER — AZELASTINE 1 MG/ML
1 SPRAY, METERED NASAL 2 TIMES DAILY
Qty: 30 ML | Refills: 11 | Status: SHIPPED | OUTPATIENT
Start: 2020-07-31 | End: 2023-03-21

## 2020-07-31 RX ORDER — MONTELUKAST SODIUM 10 MG/1
10 TABLET ORAL DAILY
Qty: 30 TABLET | Refills: 11 | Status: SHIPPED | OUTPATIENT
Start: 2020-07-31 | End: 2021-07-31

## 2020-07-31 NOTE — PROGRESS NOTES
"Subjective:   Patient: Ron Marin [id number], :1985   Visit date:2020 3:38 PM    Chief Complaint:  Other (refill on singulair and possible sinus infection)    HPI:  Ron is a 34 y.o. female who is here for follow-up.     Sinuplasty 2019 and has done extremely well since w/o infection.  Has been on SLIT and allergy meds.  Doing well with this.  Recently had a gap in SLIT and ran out of singulair but both restarted.  Facial pressure and pain.  Moderate.  No exacerbating or relieving factors.      Review of Systems:  -     Allergic/Immunologic: is allergic to bactrim [sulfamethoxazole-trimethoprim]..  -     Constitutional: Current temp: 97.5 °F (36.4 °C) (Tympanic)    -     She has a current medication list which includes the following prescription(s): allergy mix, azelastine, fluticasone propionate, levocetirizine, montelukast, multivitamin, fish oil-omega-3 fatty acids, lactobacillus rhamnosus gg, levofloxacin, and prednisone.  Objective:     Physical Exam:  Vitals:  Temp 97.5 °F (36.4 °C) (Tympanic)   Ht 5' 3" (1.6 m)   Wt 56.4 kg (124 lb 5.4 oz)   BMI 22.03 kg/m²   Appearance:  Well-developed, well-nourished.  Communication:  Able to communicate, no hoarseness.  Head & Face:  Normocephalic, atraumatic, no sinus tenderness, normal facial strength.  Eyes:  Extraocular motions intact.  Ears:  Otoscopy of external auditory canals and tympanic membranes was normal, clinical speech reception thresholds grossly intact, no mass/lesion of auricle.  Nose:  No masses/lesions of external nose, nasal mucosa, septum, and turbinates were within normal limits.  Mouth:  No mass/lesion of lips, teeth, gums, hard/soft palate, tongue, tonsils, or oropharynx.  Neck & Lymphatics:  No cervical lymphadenopathy, no neck mass/crepitus/ asymmetry, trachea is midline, no thyroid enlargement/tenderness/mass.  Neuro/Psych: Alert with normal mood and affect.   Abdominal: Normal appearance.   Respiration/Chest:  " Symmetric expansion during respiration, normal respiratory effort.  Skin:  Warm and intact  Cardiovascular:  No peripheral vascular edema or varicosities.    Assessment & Plan:   Ron was seen today for other.    Diagnoses and all orders for this visit:    Acute bacterial rhinosinusitis    Other orders  -     predniSONE (DELTASONE) 20 MG tablet; Take 3 tab in am x 3d, then 2 tab in am x 3d, then 1 tab in am x 3d, then 1/2 tab in am x 3d  -     levoFLOXacin (LEVAQUIN) 500 MG tablet; Take 1 tablet (500 mg total) by mouth once daily. for 14 days  -     azelastine (ASTELIN) 137 mcg (0.1 %) nasal spray; 1 spray (137 mcg total) by Nasal route 2 (two) times daily.  -     montelukast (SINGULAIR) 10 mg tablet; Take 1 tablet (10 mg total) by mouth once daily.      ABRS      Patient: Ron Marin [id number], :1985  Procedure date:2020  Patient's medications, allergies, past medical, surgical, social and family histories were reviewed and updated as appropriate.  Chief Complaint:  Other (refill on singulair and possible sinus infection)    HPI:  Ron is a 34 y.o. female with the history of present illness as discussed in the clinic note from today.  Anterior rhinoscopy was insufficient to explain symptoms and findings.     Procedure: Risks, benefits, and alternatives of the procedure were discussed with the patient, and the patient consented to the nasal endoscopy.  The nasal cavity was sprayed with a topical decongestant and anesthetic (if needed). The endoscope was passed into each nostril and each nasal cavity was visualized.  On each side the nasal cavity, sinuses (if open), turbinates, middle and superior meatus, sphenoethmoidal recess and septum were examined with the findings described below. At the end of the examination, the scope was removed. The patient tolerated the procedure well with no complications.       Endoscopic Sinonasal Exam Findings:  -     The right side has moderate edema  -     The  left side has moderate edema  -     Nasal secretions: purulent secretions bilaterally  -     Nasal septum: no significant deviation or perforation appreciated   -     Inferior turbinate: hypertrophy or edema (Moderate) bilaterally  -     Middle turbinate: Normal mucosa without significant hypertrophy bilaterally  -     Other findings: No mass or obstructive lesion    Assessment & Plan:  - see today's clinic note

## 2020-08-07 ENCOUNTER — OFFICE VISIT (OUTPATIENT)
Dept: ENDOCRINOLOGY | Facility: CLINIC | Age: 35
End: 2020-08-07
Payer: COMMERCIAL

## 2020-08-07 VITALS
SYSTOLIC BLOOD PRESSURE: 116 MMHG | BODY MASS INDEX: 21.48 KG/M2 | HEIGHT: 63 IN | HEART RATE: 75 BPM | RESPIRATION RATE: 16 BRPM | WEIGHT: 121.25 LBS | DIASTOLIC BLOOD PRESSURE: 79 MMHG

## 2020-08-07 DIAGNOSIS — N92.6 IRREGULAR MENSTRUAL CYCLE: ICD-10-CM

## 2020-08-07 DIAGNOSIS — N97.9 INFERTILITY, FEMALE: Primary | ICD-10-CM

## 2020-08-07 PROCEDURE — 99999 PR PBB SHADOW E&M-EST. PATIENT-LVL III: CPT | Mod: PBBFAC,,, | Performed by: INTERNAL MEDICINE

## 2020-08-07 PROCEDURE — 99999 PR PBB SHADOW E&M-EST. PATIENT-LVL III: ICD-10-PCS | Mod: PBBFAC,,, | Performed by: INTERNAL MEDICINE

## 2020-08-07 PROCEDURE — 99204 PR OFFICE/OUTPT VISIT, NEW, LEVL IV, 45-59 MIN: ICD-10-PCS | Mod: S$GLB,,, | Performed by: INTERNAL MEDICINE

## 2020-08-07 PROCEDURE — 3008F BODY MASS INDEX DOCD: CPT | Mod: CPTII,S$GLB,, | Performed by: INTERNAL MEDICINE

## 2020-08-07 PROCEDURE — 3008F PR BODY MASS INDEX (BMI) DOCUMENTED: ICD-10-PCS | Mod: CPTII,S$GLB,, | Performed by: INTERNAL MEDICINE

## 2020-08-07 PROCEDURE — 99204 OFFICE O/P NEW MOD 45 MIN: CPT | Mod: S$GLB,,, | Performed by: INTERNAL MEDICINE

## 2020-08-07 NOTE — PROGRESS NOTES
Self Referred.    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 female  Patient could not get pregnant in she has been visiting the fertility specialist.  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  She has been  for 2 years and she has been trying to get pregnant since 2019.  There is history of taking birth control pills for 15 years.  History of regular menstrual cycle,  And normal ovulation per patient.  The menstrual cycle was regular till spring 2020 then it has been sporadic and very irregular recently.  Patient said her estrogen was low.  She has been having low energy.  No complaints of dysphagia, chest pain, shortness of breath, nausea, vomiting, edema, or rash.  Currently on prednisone; 4 more days for Prednisone to finish.    Pt took supplement Folate, ALA, chromium, and Dim (3,3 Diindolylmethane)in   History of low estrogen and low LH and low FSH  History of treatment with Clomid and with letrozole every other month in the  of -till 2020  History of treatment with Folestrem (human FSH).  Patient sent estrogen which was 19 was supposed to go to above 300  But it to increased only to 92.  History of AMH of1.23 in January which was good result    Uterine ultrasound was normal except for fibroid tumor per patient.    Patient's  has normal sperm count per patient.  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  An older sister had 2 normal pregnancies  Grandmother  of type 2 diabetes  Father has type 2 diabetes and hypertension  Patient is with an [place] descent  Patient has a sister who is a GYN specialist.      Labs from record which pt brought reviewed;  Estradiol was less than  15 on 06/29/2020 and LH was 3.4 in the same day  And progesterone was 0.3 same day  Estradiol 92.8 after stim with the medication for 5 days  07/06/2020  LH was 1.2 on 07/06/2020  Progesterone was 0.5 on 07/06/2020  Estradiol was 18.5 on day 2 of menstrual cycle  Last menstrual period was 7 /1-7/7      Past Medical History:   Diagnosis Date    Environmental allergies        Past Surgical History:   Procedure Laterality Date    SINUS SURGERY         Social History     Socioeconomic History    Marital status:      Spouse name: Not on file    Number of children: Not on file    Years of education: Not on file    Highest education level: Not on file   Occupational History    Not on file   Social Needs    Financial resource strain: Not on file    Food insecurity     Worry: Not on file     Inability: Not on file    Transportation needs     Medical: Not on file     Non-medical: Not on file   Tobacco Use    Smoking status: Never Smoker   Substance and Sexual Activity    Alcohol use: No     Frequency: Never    Drug use: No    Sexual activity: Not on file   Lifestyle    Physical activity     Days per week: Not on file     Minutes per session: Not on file    Stress: Not on file   Relationships    Social connections     Talks on phone: Not on file     Gets together: Not on file     Attends Sabianism service: Not on file     Active member of club or organization: Not on file     Attends meetings of clubs or organizations: Not on file     Relationship status: Not on file   Other Topics Concern    Not on file   Social History Narrative    Not on file         ROS:   Included in HPI  No diabetes   No thyroid problem  Mild hair in abdomen and chest  ROS otherwise neg except for what is mentioned in the PMH, PSH and HPI    PE:  Vitals:    08/07/20 1117   BP: 116/79   Pulse: 75   Resp: 16     Alert and oriented  No acute distress  Mild facial hair  No acne  No Proptosis or conjunctivitis  No rash on tongue, +  teeth  No goitre by inspection  Thyroid gland is not palpable  No cervical lymphadenopathy  Heart reg, no gallop  Lungs cta, no wheezing  Abd soft, no tnd  No edema in lower legs  No rash  No bruises  Speech normal  Behavior normal  No tremor  No obesity  Body mass index is 21.48 kg/m².      Lab:    Lab Results   Component Value Date    TSH 0.757 08/22/2019         Lab Results   Component Value Date     (L) 01/28/2019    K 3.8 01/28/2019     01/28/2019    CO2 24 01/28/2019    BUN 6 01/28/2019    CREATININE 0.9 01/28/2019    CALCIUM 8.3 (L) 01/28/2019    ANIONGAP 10 01/28/2019    ESTGFRAFRICA >60 01/28/2019    EGFRNONAA >60 01/28/2019      Ref. Range 8/22/2019 14:24   TSH Latest Ref Range: 0.400 - 4.000 uIU/mL 0.757   PTH Latest Ref Range: 9.0 - 77.0 pg/mL 43.0   Estradiol Latest Ref Range: See Text pg/mL 58   FSH Latest Ref Range: See Text mIU/mL 7.30   Testosterone, Total Latest Ref Range: 5 - 73 ng/dL 9       A/P:  Infertility, female  Blood test to be done in 3 weeks  Patient will finish last dose prednisone in 4 days  (steroid may affect the test results)  Elevated A1c 5.4  Family history of diabetes  Mild Hirsutism  Metformin will be considered  -     T4, free; Future; Expected date: 08/07/2020  -     TSH; Future; Expected date: 08/07/2020  -     Cortisol, 8AM; Future; Expected date: 08/07/2020  -     Follicle stimulating hormone; Future; Expected date: 08/07/2020  -     Luteinizing hormone; Future; Expected date: 08/07/2020  -     Estradiol; Future; Expected date: 08/07/2020  -     Prolactin; Future; Expected date: 08/07/2020    Irregular menstrual cycle  -     T4, free; Future; Expected date: 08/07/2020  -     TSH; Future; Expected date: 08/07/2020  -     Cortisol, 8AM; Future; Expected date: 08/07/2020  -     Follicle stimulating hormone; Future; Expected date: 08/07/2020  -     Luteinizing hormone; Future; Expected date: 08/07/2020  -     Estradiol; Future; Expected date: 08/07/2020  -      Prolactin; Future; Expected date: 08/07/2020       Appt for follow up.      Pt understands the plan and instructions.

## 2020-08-11 ENCOUNTER — TELEPHONE (OUTPATIENT)
Dept: ENDOCRINOLOGY | Facility: CLINIC | Age: 35
End: 2020-08-11

## 2020-10-03 RX ORDER — ESTRADIOL 2 MG/1
2 TABLET ORAL 2 TIMES DAILY
Qty: 60 TABLET | Refills: 1 | Status: SHIPPED | OUTPATIENT
Start: 2020-10-03 | End: 2021-10-03

## 2020-11-09 RX ORDER — PRENATAL 56/IRON/FOLIC AC/DHA 35-5-1 MG
1 CAPSULE ORAL DAILY
Qty: 90 CAPSULE | Refills: 3 | COMMUNITY
Start: 2020-11-09 | End: 2023-09-14

## 2020-11-09 RX ORDER — FOLIC ACID 1 MG/1
4 TABLET ORAL DAILY
Qty: 120 TABLET | Refills: 6 | Status: SHIPPED | OUTPATIENT
Start: 2020-11-09 | End: 2023-09-14

## 2020-12-03 ENCOUNTER — OFFICE VISIT (OUTPATIENT)
Dept: OBSTETRICS AND GYNECOLOGY | Facility: CLINIC | Age: 35
End: 2020-12-03
Payer: COMMERCIAL

## 2020-12-03 VITALS
WEIGHT: 123 LBS | HEIGHT: 63 IN | SYSTOLIC BLOOD PRESSURE: 116 MMHG | DIASTOLIC BLOOD PRESSURE: 72 MMHG | BODY MASS INDEX: 21.79 KG/M2

## 2020-12-03 DIAGNOSIS — Z01.419 WELL WOMAN EXAM WITH ROUTINE GYNECOLOGICAL EXAM: Primary | ICD-10-CM

## 2020-12-03 DIAGNOSIS — Z12.4 ENCOUNTER FOR PAPANICOLAOU SMEAR FOR CERVICAL CANCER SCREENING: ICD-10-CM

## 2020-12-03 DIAGNOSIS — Z87.42 HISTORY OF ABNORMAL CERVICAL PAP SMEAR: ICD-10-CM

## 2020-12-03 PROCEDURE — 1126F AMNT PAIN NOTED NONE PRSNT: CPT | Mod: S$GLB,,, | Performed by: OBSTETRICS & GYNECOLOGY

## 2020-12-03 PROCEDURE — 1126F PR PAIN SEVERITY QUANTIFIED, NO PAIN PRESENT: ICD-10-PCS | Mod: S$GLB,,, | Performed by: OBSTETRICS & GYNECOLOGY

## 2020-12-03 PROCEDURE — 3008F BODY MASS INDEX DOCD: CPT | Mod: CPTII,S$GLB,, | Performed by: OBSTETRICS & GYNECOLOGY

## 2020-12-03 PROCEDURE — 99395 PR PREVENTIVE VISIT,EST,18-39: ICD-10-PCS | Mod: S$GLB,,, | Performed by: OBSTETRICS & GYNECOLOGY

## 2020-12-03 PROCEDURE — 99395 PREV VISIT EST AGE 18-39: CPT | Mod: S$GLB,,, | Performed by: OBSTETRICS & GYNECOLOGY

## 2020-12-03 PROCEDURE — 3008F PR BODY MASS INDEX (BMI) DOCUMENTED: ICD-10-PCS | Mod: CPTII,S$GLB,, | Performed by: OBSTETRICS & GYNECOLOGY

## 2020-12-03 RX ORDER — LEUPROLIDE ACETATE 1 MG/0.2ML
VIAL (ML) SUBCUTANEOUS
COMMUNITY
End: 2023-09-14

## 2020-12-03 RX ORDER — MONTELUKAST SODIUM 10 MG/1
10 TABLET ORAL DAILY
COMMUNITY
Start: 2020-11-23 | End: 2021-08-16

## 2020-12-03 RX ORDER — LEVOCETIRIZINE DIHYDROCHLORIDE 5 MG/1
5 TABLET, FILM COATED ORAL NIGHTLY
COMMUNITY
End: 2023-03-21

## 2020-12-03 RX ORDER — ESTRADIOL 2 MG/1
TABLET ORAL
COMMUNITY
Start: 2020-11-17 | End: 2023-09-14

## 2020-12-03 NOTE — PROGRESS NOTES
Subjective:      Patient ID: Ron Marin is a 35 y.o. female who presents for evaluation today.    Chief Complaint:    Well Woman      History of Present Illness  HPI  Annual Exam-Premenopausal  Patient presents for annual exam. The patient has no complaints today. The patient is sexually active. GYN screening history: last pap: patient does not recall results of last pap and patient does not recall when last pap was. The patient wears seatbelts: yes. The patient participates in regular exercise: yes. Has the patient ever been transfused or tattooed?: no. The patient reports that there is not domestic violence in her life.    GYN History  Patient's last menstrual period was 11/07/2020 (exact date).   Date of Last Pap: Pap smear completed today with HPV co-testing Pap smear schedule reviewed with patient    OB History   No obstetric history on file.       FAMILY HISTORY  Family History   Problem Relation Age of Onset    Stroke Paternal Grandfather     Diabetes Father     Hypertension Father     Arthritis Father     Hyperlipidemia Mother     Osteoporosis Maternal Aunt        SOCIAL HISTORY  Social History     Tobacco Use   Smoking Status Never Smoker   Smokeless Tobacco Never Used   ,   Social History     Substance and Sexual Activity   Alcohol Use No    Frequency: Monthly or less    Drinks per session: 1 or 2    Binge frequency: Never    Comment: socially        MEDICATIONS  Outpatient Medications Marked as Taking for the 12/3/20 encounter (Office Visit) with Reny Fuller NP   Medication Sig Dispense Refill    estradioL (ESTRACE) 2 MG tablet TK 1 T PO TID UTD      leuprolide (LUPRON) 1 mg/0.2 mL injection Inject into the skin.      levocetirizine (XYZAL) 5 MG tablet Take 5 mg by mouth every evening.      montelukast (SINGULAIR) 10 mg tablet Take 10 mg by mouth once daily.         Review of Systems   Review of Systems   Constitutional: Negative for appetite change, chills, fever and  "unexpected weight change.   HENT: Negative for mouth sores.    Eyes: Negative for visual disturbance.   Respiratory: Negative for cough and shortness of breath.    Cardiovascular: Negative for chest pain, palpitations and leg swelling.   Gastrointestinal: Negative for abdominal pain, blood in stool, nausea, vomiting and fecal incontinence.   Endocrine: Negative for hair loss and hot flashes.   Genitourinary: Negative for dysmenorrhea, dysuria, frequency, hematuria, menstrual problem, vaginal discharge, vaginal pain, urinary incontinence, vaginal dryness and vaginal odor.   Musculoskeletal: Negative for arthralgias, back pain, joint swelling and leg pain.   Integumentary:  Negative for rash, acne, hair changes, mole/lesion, breast mass, nipple discharge and breast skin changes.   Neurological: Negative for seizures and numbness.   Hematological: Negative for adenopathy. Does not bruise/bleed easily.   Psychiatric/Behavioral: Negative for depression and sleep disturbance. The patient is not nervous/anxious.    Breast: Negative for asymmetry, mass, mastodynia, nipple discharge and skin changes          Objective:    VITALS  BP Readings from Last 1 Encounters:   12/07/20 122/84   Weight: 55.8 kg (123 lb)   Height: 5' 3" (160 cm)   BMI Readings from Last 1 Encounters:   12/07/20 22.73 kg/m²        Physical Exam:   Constitutional: She is oriented to person, place, and time. She appears well-developed and well-nourished. She is cooperative. She does not appear ill. No distress.    HENT:   Head: Normocephalic and atraumatic.   Right Ear: External ear normal.   Left Ear: External ear normal.     Neck: Trachea normal and normal range of motion. Neck supple. No thyroid mass and no thyromegaly present.    Cardiovascular: Normal pulses.     Pulmonary/Chest: Effort normal and breath sounds normal. No stridor. No respiratory distress. Chest wall is not dull to percussion. She exhibits no mass, no bony tenderness, no laceration, no " crepitus, no edema, no deformity, no swelling and no retraction. Right breast exhibits no inverted nipple, no mass, no nipple discharge, no skin change, no tenderness, presence, no bleeding and no swelling. Left breast exhibits no inverted nipple, no mass, no nipple discharge, no skin change, no tenderness, presence, no bleeding and no swelling. Breasts are symmetrical.        Abdominal: Soft. Normal appearance and bowel sounds are normal. She exhibits no distension. There is no abdominal tenderness. No hernia.     Genitourinary:    Vagina, uterus and rectum normal.      Pelvic exam was performed with patient supine.   There is no rash, tenderness, lesion or injury on the right labia. There is no rash, tenderness, lesion or injury on the left labia. Cervix is normal. Right adnexum displays no mass, no tenderness and no fullness. Left adnexum displays no mass, no tenderness and no fullness. No tenderness or bleeding in the vagina.    No foreign body in the vagina.   Labial bartholins normal.Additional cervical findings: pap smear donenegative for vaginal discharge          Musculoskeletal: Normal range of motion and moves all extremeties.       Neurological: She is alert and oriented to person, place, and time.    Skin: Skin is warm and dry. No rash noted. She is not diaphoretic. No erythema. No pallor.    Psychiatric: She has a normal mood and affect. Her speech is normal and behavior is normal. Judgment and thought content normal.          Assessment:        1. Well woman exam with routine gynecological exam    2. Encounter for Papanicolaou smear for cervical cancer screening    3. History of abnormal cervical Pap smear         Plan:   Pap smear obtained today, will follow up with patient when results are received.   Patient was counseled today on current ASCCP pap smear guidelines, the recommendation for yearly pelvic and clinical breast exams, healthy diet consumption, implementing exercise routines 4-5x/week,  when to begin mammogram screenings, and breast self awareness. She is to see her PCP for other health maintenance.    Patient instructed to contact the clinic should any questions or concerns arise prior to her next office visit. Patient is happy with the plan of care at this time, verbalizes understanding and denies outstanding questions.

## 2020-12-07 ENCOUNTER — OFFICE VISIT (OUTPATIENT)
Dept: INTERNAL MEDICINE | Facility: CLINIC | Age: 35
End: 2020-12-07
Payer: COMMERCIAL

## 2020-12-07 ENCOUNTER — LAB VISIT (OUTPATIENT)
Dept: LAB | Facility: HOSPITAL | Age: 35
End: 2020-12-07
Payer: COMMERCIAL

## 2020-12-07 VITALS
DIASTOLIC BLOOD PRESSURE: 84 MMHG | BODY MASS INDEX: 22.73 KG/M2 | HEART RATE: 84 BPM | HEIGHT: 63 IN | OXYGEN SATURATION: 98 % | SYSTOLIC BLOOD PRESSURE: 122 MMHG | WEIGHT: 128.31 LBS | TEMPERATURE: 98 F

## 2020-12-07 DIAGNOSIS — N30.00 ACUTE CYSTITIS WITHOUT HEMATURIA: ICD-10-CM

## 2020-12-07 DIAGNOSIS — N30.01 ACUTE CYSTITIS WITH HEMATURIA: ICD-10-CM

## 2020-12-07 DIAGNOSIS — N30.00 ACUTE CYSTITIS WITHOUT HEMATURIA: Primary | ICD-10-CM

## 2020-12-07 LAB
BACTERIA #/AREA URNS HPF: ABNORMAL /HPF
BILIRUB UR QL STRIP: NEGATIVE
CLARITY UR: ABNORMAL
COLOR UR: YELLOW
GLUCOSE UR QL STRIP: NEGATIVE
HGB UR QL STRIP: ABNORMAL
HYALINE CASTS #/AREA URNS LPF: 0 /LPF
KETONES UR QL STRIP: NEGATIVE
LEUKOCYTE ESTERASE UR QL STRIP: ABNORMAL
MICROSCOPIC COMMENT: ABNORMAL
NITRITE UR QL STRIP: POSITIVE
PH UR STRIP: 7 [PH] (ref 5–8)
PROT UR QL STRIP: ABNORMAL
RBC #/AREA URNS HPF: 50 /HPF (ref 0–4)
SP GR UR STRIP: 1.02 (ref 1–1.03)
SQUAMOUS #/AREA URNS HPF: 3 /HPF
URN SPEC COLLECT METH UR: ABNORMAL
WBC #/AREA URNS HPF: 42 /HPF (ref 0–5)

## 2020-12-07 PROCEDURE — 87086 URINE CULTURE/COLONY COUNT: CPT

## 2020-12-07 PROCEDURE — 1126F AMNT PAIN NOTED NONE PRSNT: CPT | Mod: S$GLB,,, | Performed by: PHYSICIAN ASSISTANT

## 2020-12-07 PROCEDURE — 1126F PR PAIN SEVERITY QUANTIFIED, NO PAIN PRESENT: ICD-10-PCS | Mod: S$GLB,,, | Performed by: PHYSICIAN ASSISTANT

## 2020-12-07 PROCEDURE — 3008F BODY MASS INDEX DOCD: CPT | Mod: CPTII,S$GLB,, | Performed by: PHYSICIAN ASSISTANT

## 2020-12-07 PROCEDURE — 87077 CULTURE AEROBIC IDENTIFY: CPT

## 2020-12-07 PROCEDURE — 99214 PR OFFICE/OUTPT VISIT, EST, LEVL IV, 30-39 MIN: ICD-10-PCS | Mod: S$GLB,,, | Performed by: PHYSICIAN ASSISTANT

## 2020-12-07 PROCEDURE — 87186 SC STD MICRODIL/AGAR DIL: CPT

## 2020-12-07 PROCEDURE — 81000 URINALYSIS NONAUTO W/SCOPE: CPT

## 2020-12-07 PROCEDURE — 99999 PR PBB SHADOW E&M-EST. PATIENT-LVL IV: CPT | Mod: PBBFAC,,, | Performed by: PHYSICIAN ASSISTANT

## 2020-12-07 PROCEDURE — 99214 OFFICE O/P EST MOD 30 MIN: CPT | Mod: S$GLB,,, | Performed by: PHYSICIAN ASSISTANT

## 2020-12-07 PROCEDURE — 87088 URINE BACTERIA CULTURE: CPT

## 2020-12-07 PROCEDURE — 3008F PR BODY MASS INDEX (BMI) DOCUMENTED: ICD-10-PCS | Mod: CPTII,S$GLB,, | Performed by: PHYSICIAN ASSISTANT

## 2020-12-07 PROCEDURE — 99999 PR PBB SHADOW E&M-EST. PATIENT-LVL IV: ICD-10-PCS | Mod: PBBFAC,,, | Performed by: PHYSICIAN ASSISTANT

## 2020-12-07 RX ORDER — AMOXICILLIN AND CLAVULANATE POTASSIUM 875; 125 MG/1; MG/1
1 TABLET, FILM COATED ORAL 2 TIMES DAILY
Qty: 20 TABLET | Refills: 0 | Status: SHIPPED | OUTPATIENT
Start: 2020-12-07 | End: 2020-12-10

## 2020-12-07 NOTE — PATIENT INSTRUCTIONS
Understanding Urinary Tract Infections (UTIs)  Most UTIs are caused by bacteria, although they may also be caused by viruses or fungi. Bacteria from the bowel are the most common source of infection. The infection may start because of any of the following:  · Sexual activity. During sex, bacteria can travel from the penis, vagina, or rectum into the urethra.   · Bacteria on the skin outside the rectum may travel into the urethra. This is more common in women since the rectum and urethra are closer to each other than in men. Wiping from front to back after using the toilet and keeping the area clean can help prevent germs from getting to the urethra.  · Blockage of urine flow through the urinary tract. If urine sits too long, germs may start to grow out of control.      Parts of the urinary tract  The infection can occur in any part of the urinary tract.  · The kidneys collect and store urine.  · The ureters carry urine from the kidneys to the bladder.  · The bladder holds urine until you are ready to let it out.  · The urethra carries urine from the bladder out of the body. It is shorter in women, so bacteria can move through it more easily. The urethra is longer in men, so a UTI is less likely to reach the bladder or kidneys in men.  Date Last Reviewed: 1/1/2017  © 2439-6361 [employer]. [address] [place] [address], [place] [address]. All rights reserved. This information is not intended as a substitute for professional medical care. Always follow your healthcare professional's instructions.

## 2020-12-07 NOTE — PROGRESS NOTES
Subjective:      Patient ID: Ron Marin is a 34 y.o. female.    Chief Complaint: Urinary Tract Infection    Dysuria   This is a new problem. The current episode started today. There has been no fever. She is sexually active. There is no history of pyelonephritis. Associated symptoms include frequency, hematuria and urgency. Pertinent negatives include no chills, flank pain, nausea, vomiting, constipation or rash. She has tried nothing for the symptoms. There is no history of catheterization, diabetes insipidus, diabetes mellitus, genitourinary reflux, hypertension, kidney stones, recurrent UTIs, a single kidney, STD, urinary stasis or a urological procedure.     Patient Active Problem List   Diagnosis    Non-seasonal allergic rhinitis         Review of Systems   Constitutional: Negative for activity change, appetite change, chills, diaphoresis, fatigue, fever and unexpected weight change.   HENT: Negative.    Eyes: Negative.    Respiratory: Negative for cough, chest tightness and shortness of breath.    Cardiovascular: Negative for chest pain, palpitations and leg swelling.   Gastrointestinal: Positive for abdominal pain (suprapubic). Negative for abdominal distention, anal bleeding, blood in stool, constipation, diarrhea, nausea, rectal pain and vomiting.   Endocrine: Negative for cold intolerance, heat intolerance, polydipsia, polyphagia and polyuria.   Genitourinary: Positive for dysuria, frequency, hematuria, pelvic pain and urgency. Negative for decreased urine volume, difficulty urinating, enuresis, flank pain, genital sores, menstrual problem, vaginal bleeding, vaginal discharge and vaginal pain.   Musculoskeletal: Negative for back pain.   Skin: Negative for color change, pallor, rash and wound.   Neurological: Negative for dizziness, weakness and headaches.     Objective:   /84 (BP Location: Left arm, Patient Position: Sitting, BP Method: Medium (Manual))   Pulse 84   Temp 98.2 °F (36.8  "°C) (Tympanic)   Ht 5' 3" (1.6 m)   Wt 58.2 kg (128 lb 4.9 oz)   SpO2 98%   BMI 22.73 kg/m²     Physical Exam  Vitals signs reviewed.   Constitutional:       Appearance: She is well-developed.   HENT:      Head: Normocephalic and atraumatic.      Right Ear: External ear normal.      Left Ear: External ear normal.      Nose: Nose normal.   Eyes:      Conjunctiva/sclera: Conjunctivae normal.      Pupils: Pupils are equal, round, and reactive to light.   Neck:      Musculoskeletal: Normal range of motion.   Cardiovascular:      Rate and Rhythm: Normal rate and regular rhythm.      Heart sounds: Normal heart sounds. No murmur. No friction rub. No gallop.    Pulmonary:      Effort: Pulmonary effort is normal. No respiratory distress.      Breath sounds: Normal breath sounds. No wheezing or rales.   Chest:      Chest wall: No tenderness.   Abdominal:      General: Bowel sounds are normal. There is no distension.      Palpations: Abdomen is soft. Abdomen is not rigid. There is no fluid wave, hepatomegaly, splenomegaly, mass or pulsatile mass.      Tenderness: There is abdominal tenderness in the suprapubic area. There is no guarding or rebound.   Musculoskeletal: Normal range of motion.   Skin:     General: Skin is warm.      Findings: No rash.   Neurological:      Mental Status: She is alert and oriented to person, place, and time.   Psychiatric:         Behavior: Behavior normal.         Thought Content: Thought content normal.         Judgment: Judgment normal.         Assessment:     1. Acute cystitis without hematuria      Plan:   Acute cystitis without hematuria  -     Urinalysis; Future; Expected date: 12/07/2020  -     Urine culture; Future    -increase fluids  Educational handout on over-the-counter medications and at-home conservative care, pertinent to the patients diagnosis today, was handed to the patient and discussed in detail.       Follow up if symptoms worsen or fail to improve.        "

## 2020-12-09 LAB — BACTERIA UR CULT: ABNORMAL

## 2020-12-10 ENCOUNTER — PATIENT MESSAGE (OUTPATIENT)
Dept: INTERNAL MEDICINE | Facility: CLINIC | Age: 35
End: 2020-12-10

## 2020-12-10 ENCOUNTER — CLINICAL SUPPORT (OUTPATIENT)
Dept: INTERNAL MEDICINE | Facility: CLINIC | Age: 35
End: 2020-12-10
Payer: COMMERCIAL

## 2020-12-10 DIAGNOSIS — N30.00 ACUTE CYSTITIS WITHOUT HEMATURIA: ICD-10-CM

## 2020-12-10 DIAGNOSIS — N30.00 ACUTE CYSTITIS WITHOUT HEMATURIA: Primary | ICD-10-CM

## 2020-12-10 PROCEDURE — 99999 PR PBB SHADOW E&M-EST. PATIENT-LVL III: CPT | Mod: PBBFAC,,,

## 2020-12-10 PROCEDURE — 96372 PR INJECTION,THERAP/PROPH/DIAG2ST, IM OR SUBCUT: ICD-10-PCS | Mod: S$GLB,,, | Performed by: PHYSICIAN ASSISTANT

## 2020-12-10 PROCEDURE — 96372 THER/PROPH/DIAG INJ SC/IM: CPT | Mod: S$GLB,,, | Performed by: PHYSICIAN ASSISTANT

## 2020-12-10 PROCEDURE — 99999 PR PBB SHADOW E&M-EST. PATIENT-LVL III: ICD-10-PCS | Mod: PBBFAC,,,

## 2020-12-10 RX ORDER — CEFTRIAXONE 1 G/1
1 INJECTION, POWDER, FOR SOLUTION INTRAMUSCULAR; INTRAVENOUS
Status: COMPLETED | OUTPATIENT
Start: 2020-12-10 | End: 2020-12-10

## 2020-12-10 RX ORDER — LIDOCAINE HYDROCHLORIDE 10 MG/ML
1 INJECTION INFILTRATION; PERINEURAL ONCE
Status: DISCONTINUED | OUTPATIENT
Start: 2020-12-10 | End: 2020-12-10

## 2020-12-10 RX ORDER — LIDOCAINE HYDROCHLORIDE 10 MG/ML
2.1 INJECTION INFILTRATION; PERINEURAL ONCE
Status: COMPLETED | OUTPATIENT
Start: 2020-12-10 | End: 2020-12-10

## 2020-12-10 RX ADMIN — LIDOCAINE HYDROCHLORIDE 2.1 ML: 10 INJECTION INFILTRATION; PERINEURAL at 11:12

## 2020-12-10 RX ADMIN — CEFTRIAXONE 1 G: 1 INJECTION, POWDER, FOR SOLUTION INTRAMUSCULAR; INTRAVENOUS at 11:12

## 2020-12-10 NOTE — PROGRESS NOTES
Patient here per Parul Dahl PA-C for a ceftriaxone 1 gram injection. Administered as per orders and guidelines. Patient asked to remain in clinic 15 minutes post-injection to monitor for reactions. See MAR for further details.

## 2020-12-11 PROBLEM — F98.8 ATTENTION DEFICIT DISORDER (ADD) WITHOUT HYPERACTIVITY: Status: ACTIVE | Noted: 2017-10-26

## 2020-12-11 PROBLEM — Z30.41 ORAL CONTRACEPTIVE USE: Status: ACTIVE | Noted: 2017-10-26

## 2020-12-11 NOTE — PATIENT INSTRUCTIONS
Breast Health: Breast Self-Awareness  What is breast self-awareness?  Breast self-awareness is knowing how your breasts normally look and feel. Your breasts change as you go through different stages of your life. So its important to learn what is normal for your breasts. Breast self-awareness helps you notice any changes in your breasts right away. Report any changes to your healthcare provider.  Why is breast self-awareness important?  Many experts now say that women should focus on breast self-awareness instead of doing a breast self-examination (BSE). These experts include the American Cancer Society, the U.S. Preventive Services Task Force, and the American Congress of Obstetricians and Gynecologists. Some experts even advise not teaching women to do a BSE. Thats because research hasnt shown a clear benefit to doing BSEs.  Breast self-awareness is different than a BSE. Breast self-awareness isnt about following a certain method and schedule. Its about knowing what's normal for your breasts. That way you can notice even small changes right away. If you see any changes, report them to your healthcare provider.  Changes to look for  Call your healthcare provider if you find any changes in your breasts that concern you. These changes may include:  · A lump  · Nipple discharge other than breast milk, especially a bloody discharge  · Swelling  · A change in size or shape  · Skin irritation, such as redness, thickening, or dimpling of the skin  · Swollen lymph nodes in the armpit  · Nipple problems, such as pain or redness  If you find a lump  Contact your provider if you find lumpiness in one breast, feel something different in the tissue, or feel a definite lump. Sometimes lumpiness may be due to menstrual changes. But there may be reason for concern.  Your provider may want to see you right away if you have:  · Nipple discharge that is bloody  · Skin changes on your breast, such as dimpling or puckering  Its  normal to be upset if you find a lump. But its important to contact your provider right away. Remember that most breast lumps are benign. This means they are not cancer.  Date Last Reviewed: 8/10/2015  © 7600-5911 [employer]. [address] [place] [address]. All rights reserved. This information is not intended as a substitute for professional medical care. Always follow your healthcare professional's instructions.          The Range of Pap Test Results  When your Pap test is sent to the lab, the lab studies your cell samples and reports any abnormal cell changes. Your health care provider can discuss these changes with you. In some cases, an abnormal Pap test is due to an infection. More serious cell changes range from dysplasia to cancer. Talk to your health care provider about your Pap test.    Normal results  Cervical cells, even normal ones, are always changing. As they mature, normal squamous cells move from deeper layers within the cervix. Over time, these cells flatten and cover the surface of the cervix. Within the cervical canal, the cells are different. These glandular cells are taller and not as flat as the cells on the surface of the cervix. When a Pap test sample shows healthy cells of both types, the results are negative. Keep having Pap tests as often as directed.  Abnormal results  A positive Pap test result means some cells in the sample showed abnormal changes. These results are grouped by the type of cell change and the location, or extent, of the changes. Depending on the results, you may need further testing.  · Inflammation: Noncancerous changes are present. They may be due to normal cell repair. Or, they may be caused by an infection, such as HPV or yeast. Further testing may be needed. (Also called reactive cellular changes.)  · Atypical squamous cells: Test results are unclear. Cells on the surface of the cervix show changes, but their significance is not yet  known. Testing for HPV and other sexually transmitted infections (STIs) may be needed. Treatment may be required. (Reported as ASC-US or ASC-H.)  · Atypical glandular cells: Cells lining the cervical canal show abnormal changes. Further testing is likely. You may also have treatment to destroy or remove problem cells. (Reported as AGC.)  · Mild dysplasia: Cells show distinct changes. More testing or HPV typing may be done. You may also have treatment to destroy or remove problem cells. (Reported as low-grade LEILA or MONTY 1.)  · Moderate to severe dysplasia: Cells show precancerous changes. Or, noninvasive cancer (carcinoma in situ) may be present. Treatment to destroy or remove problem cells is likely. (Reported as high-grade LEILA or MONTY 2 or MONTY 3.)  · Cancer: Different types of cancer may be detected by your Pap test. More tests to assess the cancer's extent are likely. The type of treatment will depend on the test results and other factors, such as age and health history. (Reported as squamous cell carcinoma, endocervical adenocarcinoma in situ, or adenocarcinoma.)  Date Last Reviewed: 5/12/2015  © 5624-0645 [employer]. [address] [place] [address], [place] [address]. All rights reserved. This information is not intended as a substitute for professional medical care. Always follow your healthcare professional's instructions.

## 2021-02-18 RX ORDER — LEVOFLOXACIN 500 MG/1
500 TABLET, FILM COATED ORAL DAILY
Qty: 14 TABLET | Refills: 0 | Status: SHIPPED | OUTPATIENT
Start: 2021-02-18 | End: 2021-02-19 | Stop reason: SDUPTHER

## 2021-02-18 RX ORDER — PREDNISONE 20 MG/1
TABLET ORAL
Qty: 21 TABLET | Refills: 0 | Status: SHIPPED | OUTPATIENT
Start: 2021-02-18 | End: 2021-02-19 | Stop reason: SDUPTHER

## 2021-02-19 RX ORDER — PREDNISONE 20 MG/1
TABLET ORAL
Qty: 21 TABLET | Refills: 0 | Status: SHIPPED | OUTPATIENT
Start: 2021-02-19 | End: 2021-10-13 | Stop reason: SDUPTHER

## 2021-02-19 RX ORDER — LEVOFLOXACIN 500 MG/1
500 TABLET, FILM COATED ORAL DAILY
Qty: 14 TABLET | Refills: 0 | Status: SHIPPED | OUTPATIENT
Start: 2021-02-19 | End: 2021-03-05

## 2021-06-14 RX ORDER — CIPROFLOXACIN 500 MG/1
500 TABLET ORAL 2 TIMES DAILY
Qty: 14 TABLET | Refills: 0 | Status: SHIPPED | OUTPATIENT
Start: 2021-06-14 | End: 2021-06-14 | Stop reason: SDUPTHER

## 2021-06-14 RX ORDER — CIPROFLOXACIN 500 MG/1
500 TABLET ORAL 2 TIMES DAILY
Qty: 14 TABLET | Refills: 0 | Status: SHIPPED | OUTPATIENT
Start: 2021-06-14 | End: 2021-06-21

## 2021-07-08 RX ORDER — LEVOFLOXACIN 500 MG/1
500 TABLET, FILM COATED ORAL DAILY
Qty: 10 TABLET | Refills: 0 | Status: SHIPPED | OUTPATIENT
Start: 2021-07-08 | End: 2021-07-18

## 2021-07-08 RX ORDER — PREDNISONE 20 MG/1
TABLET ORAL
Qty: 20 TABLET | Refills: 0 | Status: SHIPPED | OUTPATIENT
Start: 2021-07-08 | End: 2023-03-22

## 2021-08-14 RX ORDER — MONTELUKAST SODIUM 10 MG/1
10 TABLET ORAL EVERY MORNING
Qty: 30 TABLET | Refills: 5 | Status: SHIPPED | OUTPATIENT
Start: 2021-08-14 | End: 2021-09-13

## 2021-08-19 RX ORDER — BENZONATATE 100 MG/1
100 CAPSULE ORAL 3 TIMES DAILY PRN
Qty: 90 CAPSULE | Refills: 2 | Status: SHIPPED | OUTPATIENT
Start: 2021-08-19 | End: 2021-09-18

## 2021-09-30 ENCOUNTER — TELEPHONE (OUTPATIENT)
Dept: OBSTETRICS AND GYNECOLOGY | Facility: CLINIC | Age: 36
End: 2021-09-30

## 2021-10-13 RX ORDER — LEVOFLOXACIN 750 MG/1
750 TABLET ORAL DAILY
Qty: 7 TABLET | Refills: 0 | Status: SHIPPED | OUTPATIENT
Start: 2021-10-13 | End: 2021-10-20

## 2021-10-13 RX ORDER — PREDNISONE 20 MG/1
TABLET ORAL
Qty: 21 TABLET | Refills: 0 | Status: SHIPPED | OUTPATIENT
Start: 2021-10-13 | End: 2023-03-22

## 2021-11-28 DIAGNOSIS — Z32.00 POSSIBLE PREGNANCY, NOT YET CONFIRMED: Primary | ICD-10-CM

## 2021-11-28 LAB — B-HCG SERPL-ACNC: < 1 MIU/ML

## 2021-12-03 LAB
ESTRADIOL, SERUM: 591 PG/ML
PROGESTERONE HPLC MS/MS: 55.66 NG/ML

## 2021-12-07 LAB
APPEARANCE, UA: CLEAR
B-HCG UR QL: NEGATIVE
BACTERIA SPEC CULT: ABNORMAL /HPF
BASOPHILS NFR BLD: 0.6 % (ref 0–3)
BILIRUB UR QL STRIP: NEGATIVE MG/DL
COLOR UR: ABNORMAL
EOSINOPHIL NFR BLD: 1 % (ref 1–3)
ERYTHROCYTE [DISTWIDTH] IN BLOOD BY AUTOMATED COUNT: 12.5 % (ref 12.5–18)
GLUCOSE (UA): NORMAL MG/DL
HCT VFR BLD AUTO: 45.5 % (ref 37–47)
HGB BLD-MCNC: 14.7 G/DL (ref 12–16)
HGB UR QL STRIP: 250 /UL
KETONES UR QL STRIP: NEGATIVE MG/DL
LEUKOCYTE ESTERASE UR QL STRIP: NEGATIVE /UL
LYMPHOCYTES NFR BLD: 31.9 % (ref 25–40)
MCH RBC QN AUTO: 27.3 PG (ref 27–31.2)
MCHC RBC AUTO-ENTMCNC: 32.3 G/DL (ref 31.8–35.4)
MCV RBC AUTO: 84.4 FL (ref 80–97)
MONOCYTES NFR BLD: 5.9 % (ref 1–15)
NEUTROPHILS # BLD AUTO: 4.23 10*3/UL (ref 1.8–7.7)
NEUTROPHILS NFR BLD: 60.3 % (ref 37–80)
NITRITE UR QL STRIP: NEGATIVE
NUCLEATED RED BLOOD CELLS: 0 %
PH UR STRIP: 6 PH (ref 5–9)
PLATELETS: 319 10*3/UL (ref 142–424)
PROT UR QL STRIP: 30 MG/DL
RBC # BLD AUTO: 5.39 10*6/UL (ref 4.2–5.4)
RBC #/AREA URNS HPF: ABNORMAL /HPF (ref 0–2)
SERVICE COMMENT 03: ABNORMAL
SP GR UR STRIP: 1.01 (ref 1–1.03)
SPECIMEN COLLECTION METHOD, URINE: ABNORMAL
SQUAMOUS EPITHELIAL, UA: ABNORMAL /LPF
UROBILINOGEN UR STRIP-ACNC: NORMAL MG/DL
WBC # BLD: 7 10*3/UL (ref 4.6–10.2)
WBC #/AREA URNS HPF: ABNORMAL /HPF (ref 0–5)

## 2021-12-09 LAB — SPECIMEN TO PATHOLOGY: NORMAL

## 2022-01-03 RX ORDER — PANTOPRAZOLE SODIUM 20 MG/1
20 TABLET, DELAYED RELEASE ORAL DAILY
Qty: 30 TABLET | Refills: 6 | Status: SHIPPED | OUTPATIENT
Start: 2022-01-03 | End: 2023-03-21

## 2022-01-31 RX ORDER — CITALOPRAM 20 MG/1
20 TABLET, FILM COATED ORAL DAILY
Qty: 30 TABLET | Refills: 11 | Status: SHIPPED | OUTPATIENT
Start: 2022-01-31 | End: 2023-01-13 | Stop reason: SDUPTHER

## 2022-04-01 RX ORDER — CEPHALEXIN 500 MG/1
500 CAPSULE ORAL EVERY 12 HOURS
Qty: 20 CAPSULE | Refills: 0 | Status: SHIPPED | OUTPATIENT
Start: 2022-04-01 | End: 2022-04-01 | Stop reason: SDUPTHER

## 2022-04-01 RX ORDER — CEPHALEXIN 500 MG/1
500 CAPSULE ORAL EVERY 12 HOURS
Qty: 20 CAPSULE | Refills: 0 | Status: SHIPPED | OUTPATIENT
Start: 2022-04-01 | End: 2022-04-11

## 2022-04-04 NOTE — PROGRESS NOTES
Pt called in for a cut on finger that happened at home and was with pain, surrounding erythema, and warmth. Issued Keflex and advised to f/u in clinic if symptoms do not improve

## 2022-05-05 ENCOUNTER — PATIENT MESSAGE (OUTPATIENT)
Dept: ADMINISTRATIVE | Facility: HOSPITAL | Age: 37
End: 2022-05-05
Payer: COMMERCIAL

## 2022-05-28 RX ORDER — CLINDAMYCIN PHOSPHATE AND BENZOYL PEROXIDE 10; 50 MG/G; MG/G
GEL TOPICAL DAILY
Qty: 45 G | Refills: 2 | Status: SHIPPED | OUTPATIENT
Start: 2022-05-28 | End: 2023-03-22

## 2022-05-28 RX ORDER — CEPHALEXIN 500 MG/1
500 CAPSULE ORAL EVERY 12 HOURS
Qty: 10 CAPSULE | Refills: 0 | Status: SHIPPED | OUTPATIENT
Start: 2022-05-28 | End: 2022-06-02

## 2022-09-04 RX ORDER — IPRATROPIUM BROMIDE 21 UG/1
2 SPRAY, METERED NASAL 2 TIMES DAILY
Qty: 30 ML | Refills: 3 | Status: SHIPPED | OUTPATIENT
Start: 2022-09-04 | End: 2023-02-20

## 2023-01-13 RX ORDER — CITALOPRAM 20 MG/1
20 TABLET, FILM COATED ORAL DAILY
Qty: 30 TABLET | Refills: 11 | Status: SHIPPED | OUTPATIENT
Start: 2023-01-13 | End: 2023-09-14 | Stop reason: SDUPTHER

## 2023-02-17 DIAGNOSIS — K86.89 PANCREATIC PAIN: Primary | ICD-10-CM

## 2023-03-19 ENCOUNTER — TELEPHONE (OUTPATIENT)
Dept: GASTROENTEROLOGY | Facility: CLINIC | Age: 38
End: 2023-03-19
Payer: COMMERCIAL

## 2023-03-19 DIAGNOSIS — K21.9 GASTROESOPHAGEAL REFLUX DISEASE, UNSPECIFIED WHETHER ESOPHAGITIS PRESENT: Primary | ICD-10-CM

## 2023-03-20 NOTE — TELEPHONE ENCOUNTER
This patient may be set up for EGD at [facility] [place] 3/30/2023 Thursday if that day works for her (Dr. Riojas's sister). Reflux, ask if she had any trouble swallowing or food getting stuck on the way down her esophagus.  Update Epic chart.  NBP

## 2023-03-21 ENCOUNTER — TELEPHONE (OUTPATIENT)
Dept: GASTROENTEROLOGY | Facility: CLINIC | Age: 38
End: 2023-03-21
Payer: COMMERCIAL

## 2023-03-21 VITALS — HEIGHT: 63 IN | WEIGHT: 128 LBS | BODY MASS INDEX: 22.68 KG/M2

## 2023-03-21 RX ORDER — PANTOPRAZOLE SODIUM 40 MG/1
40 TABLET, DELAYED RELEASE ORAL
COMMUNITY
Start: 2023-03-10 | End: 2023-05-03

## 2023-03-21 NOTE — TELEPHONE ENCOUNTER
Rec'd a call back from the pt. Scheduled EGD for 3/30/23 @ [facility]. Updated pt medical chart and went over the prep instructions. Pt declined having instructions emailed to her. However, I did give her the physical address to [facility].  Pt denies any swallowing issues or getting food stuck. She specifics it more of a painful/pressure feeling and she has to watch what she eats kj

## 2023-03-21 NOTE — TELEPHONE ENCOUNTER
"Tried calling pt at the numbers listed. They are different. The mobile number is not correct, The voicemail states "Trenna". Left a message on the home number, but I'm not sure if that even correct. [email or web address]  "

## 2023-03-22 NOTE — TELEPHONE ENCOUNTER
[facility]  [address] [place] [address]  Phone: [phone]  Fax: [phone]    History & Physical         Provider: Dr. Roselia Stinson    Patient Name: Ron PETTY (age):1985  37 y.o.           Gender: female   Phone: [phone]     Referring Physician: Reny Fuller     Vital Signs:   Height - 5' 3'  Weight - 128 lb  BMI -  22.67    Plan: EGD    Encounter Diagnosis   Name Primary?    Gastroesophageal reflux disease, unspecified whether esophagitis present Yes           History:      Past Medical History:   Diagnosis Date    Abnormal glandular Papanicolaou smear of cervix 2015 2:31:38 PM    [facility]: Abnormal PAP Smear-No Additional Notes    Chronic sinusitis     Environmental allergies     Human papillomavirus in conditions classified elsewhere and of unspecified site 2015 2:28:29 PM    [facility]: Human Papilloma Virus Infection-No Additional Notes      Past Surgical History:   Procedure Laterality Date    BALLOON SINUPLASTY OF PARANASAL SINUS      ROBOTIC ABLATION OR EXCISION, ENDOMETRIOSIS      SINUS SURGERY        Medication List with Changes/Refills   Current Medications    CITALOPRAM (CELEXA) 20 MG TABLET    Take 1 tablet (20 mg total) by mouth once daily.    ESTRADIOL (ESTRACE) 2 MG TABLET    TK 1 T PO TID UTD    FLUTICASONE (FLONASE) 50 MCG/ACTUATION NASAL SPRAY    1 spray by Each Nare route once daily.    FOLIC ACID (FOLVITE) 1 MG TABLET    Take 4 tablets (4 mg total) by mouth once daily.    IPRATROPIUM (ATROVENT) 21 MCG (0.03 %) NASAL SPRAY    SPRAY 2 SPRAYS BY NASAL ROUTE TWICE A DAY    LEUPROLIDE (LUPRON) 1 MG/0.2 ML INJECTION    Inject into the skin.    LEVOCETIRIZINE (XYZAL) 5 MG TABLET    Take 5 mg by mouth every evening.    MONTELUKAST (SINGULAIR) 10 MG TABLET    TAKE 1 TABLET BY MOUTH EVERY DAY IN THE MORNING     MULTIVITAMIN CAPSULE    Take 1 capsule by mouth once daily.    NORGESTREL-ETHINYL ESTRADIOL (LO/OVRAL) 0.3-30 MG-MCG PER TABLET    Take 1 tablet by mouth once daily.    PANTOPRAZOLE (PROTONIX) 40 MG TABLET    Take 40 mg by mouth.    PNV #56-IRON-FOLIC ACID-DHA (OB COMPLETE PETITE) 35 MG IRON-5 MG IRON-1 MG CAP    Take 1 tablet by mouth once daily.   Discontinued Medications    ALLERGY MIX    New SLIT - formulation in notes    AZELASTINE (ASTELIN) 137 MCG (0.1 %) NASAL SPRAY    1 spray (137 mcg total) by Nasal route 2 (two) times daily.    CLINDAMYCIN-BENZOYL PEROXIDE GEL    Apply topically once daily. for 7 days    LEVOCETIRIZINE (XYZAL) 5 MG TABLET    Take 5 mg by mouth every evening.    PANTOPRAZOLE (PROTONIX) 20 MG TABLET    Take 1 tablet (20 mg total) by mouth once daily.    PREDNISONE (DELTASONE) 20 MG TABLET    Take 3 tab in am x 3d, then 2 tab in am x 3d, then 1 tab in am x 3d, then 1/2 tab in am x 3d    PREDNISONE (DELTASONE) 20 MG TABLET    Take 3 tab in am x 3d, then 2 tab in am x 3d, then 1 tab in am x 3d, then 1/2 tab in am x 3d      Review of patient's allergies indicates:   Allergen Reactions    Bactrim [sulfamethoxazole-trimethoprim] Rash    Sulfa (sulfonamide antibiotics)       Family History   Problem Relation Age of Onset    Stroke Paternal Grandfather     Diabetes Father     Hypertension Father     Arthritis Father     Hyperlipidemia Mother     Osteoporosis Maternal Aunt       Social History     Tobacco Use    Smoking status: Never    Smokeless tobacco: Never   Substance Use Topics    Alcohol use: Yes     Comment: socially    Drug use: No        Physical Examination:     General Appearance:___________________________  HEENT:  _____________________________________  Abdomen:____________________________________  Heart:________________________________________  Lungs:_______________________________________  Extremities:___________________________________  Skin:_________________________________________  Endocrine:____________________________________  Genitourinary:_________________________________  Neurological:__________________________________      Patient has been evaluated immediately prior to sedation and is medically cleared for endoscopy with IVCS as an ASA class: ______      Physician Signature: _________________________       Date: ________  Time: ________

## 2023-03-23 ENCOUNTER — TELEPHONE (OUTPATIENT)
Dept: ADMINISTRATIVE | Facility: CLINIC | Age: 38
End: 2023-03-23
Payer: COMMERCIAL

## 2023-03-30 ENCOUNTER — OUTSIDE PLACE OF SERVICE (OUTPATIENT)
Dept: GASTROENTEROLOGY | Facility: CLINIC | Age: 38
End: 2023-03-30

## 2023-03-30 PROCEDURE — 43239 EGD BIOPSY SINGLE/MULTIPLE: CPT | Mod: ,,, | Performed by: INTERNAL MEDICINE

## 2023-03-30 PROCEDURE — 43239 PR EGD, FLEX, W/BIOPSY, SGL/MULTI: ICD-10-PCS | Mod: ,,, | Performed by: INTERNAL MEDICINE

## 2023-04-04 ENCOUNTER — TELEPHONE (OUTPATIENT)
Dept: GASTROENTEROLOGY | Facility: CLINIC | Age: 38
End: 2023-04-04
Payer: COMMERCIAL

## 2023-04-04 DIAGNOSIS — K21.9 GASTROESOPHAGEAL REFLUX DISEASE, UNSPECIFIED WHETHER ESOPHAGITIS PRESENT: Primary | ICD-10-CM

## 2023-04-04 NOTE — TELEPHONE ENCOUNTER
DBx IEL, GBx wnl w/o Hp, EBx nl.    I will notify patient. No signs of infection, precancerous cells or Celiac disease on the upper endoscopy biopsies.    NBP

## 2023-04-05 RX ORDER — FAMOTIDINE 40 MG/1
40 TABLET, FILM COATED ORAL 2 TIMES DAILY
Qty: 180 TABLET | Refills: 1 | Status: SHIPPED | OUTPATIENT
Start: 2023-04-05 | End: 2023-09-21 | Stop reason: SDUPTHER

## 2023-04-05 NOTE — TELEPHONE ENCOUNTER
I reviewed with patient.  January 3, 2022 she had acute onset of epigastric pain.  She was unable to eat for 4 days.  It took 4 days for omeprazole that she started at the onset to kick in.  Dr. Roma Espitia had switched her omeprazole to pantoprazole she was told to take it for 90 days.  When she tried to discontinue it the pain recurred therefore she resumed it in stayed on it.  She still has a constant epigastric discomfort.  More so in the morning.  She has significant environmental allergies.  She sometimes has the head of her bed elevated due to sinuses.    We will try transitioning her pantoprazole to famotidine 40 mg twice a day.  If that works well for her then she will try just taking the morning dose.  She does not have a bed frame or box spring that is easy to elevate the head of.  She will send me an update.  NBP   done

## 2023-05-03 NOTE — TELEPHONE ENCOUNTER
Patient did well on famotidine 40 mg twice a day.  She switch to once a day on April 21, 2023 and has not had any issues.  She will notify me if she develops any issues.  DOROTHY

## 2023-06-19 RX ORDER — MONTELUKAST SODIUM 10 MG/1
10 TABLET ORAL DAILY
Qty: 90 TABLET | Refills: 3 | Status: SHIPPED | OUTPATIENT
Start: 2023-06-19 | End: 2024-06-18

## 2023-07-17 RX ORDER — BUPROPION HYDROCHLORIDE 75 MG/1
75 TABLET ORAL 2 TIMES DAILY
Qty: 60 TABLET | Refills: 11 | Status: SHIPPED | OUTPATIENT
Start: 2023-07-17 | End: 2023-07-17 | Stop reason: SDUPTHER

## 2023-07-17 RX ORDER — BUPROPION HYDROCHLORIDE 75 MG/1
75 TABLET ORAL 2 TIMES DAILY
Qty: 60 TABLET | Refills: 11 | Status: SHIPPED | OUTPATIENT
Start: 2023-07-17 | End: 2024-07-16

## 2023-08-24 DIAGNOSIS — M25.511 RIGHT SHOULDER PAIN, UNSPECIFIED CHRONICITY: Primary | ICD-10-CM

## 2023-08-25 ENCOUNTER — TELEPHONE (OUTPATIENT)
Dept: ADMINISTRATIVE | Facility: OTHER | Age: 38
End: 2023-08-25
Payer: COMMERCIAL

## 2023-08-25 NOTE — TELEPHONE ENCOUNTER
NINFA with scheduled appointment with Dr.Kelly Cid of 9-14-23, and contact number provided for any questions. My Chart message to be sent.

## 2023-09-14 ENCOUNTER — OFFICE VISIT (OUTPATIENT)
Dept: PAIN MEDICINE | Facility: CLINIC | Age: 38
End: 2023-09-14
Payer: COMMERCIAL

## 2023-09-14 VITALS
OXYGEN SATURATION: 98 % | DIASTOLIC BLOOD PRESSURE: 78 MMHG | HEIGHT: 63 IN | BODY MASS INDEX: 25.16 KG/M2 | HEART RATE: 103 BPM | SYSTOLIC BLOOD PRESSURE: 121 MMHG | WEIGHT: 142 LBS

## 2023-09-14 DIAGNOSIS — M54.2 CERVICALGIA: ICD-10-CM

## 2023-09-14 DIAGNOSIS — M25.511 CHRONIC PERISCAPULAR PAIN ON RIGHT SIDE: Primary | ICD-10-CM

## 2023-09-14 DIAGNOSIS — M79.18 RHOMBOID PAIN: ICD-10-CM

## 2023-09-14 DIAGNOSIS — M54.12 CERVICAL RADICULOPATHY: ICD-10-CM

## 2023-09-14 DIAGNOSIS — G89.29 CHRONIC RIGHT SHOULDER PAIN: ICD-10-CM

## 2023-09-14 DIAGNOSIS — M75.51 SCAPULOTHORACIC BURSITIS OF RIGHT SHOULDER: ICD-10-CM

## 2023-09-14 DIAGNOSIS — G89.29 CHRONIC PERISCAPULAR PAIN ON RIGHT SIDE: Primary | ICD-10-CM

## 2023-09-14 DIAGNOSIS — M25.511 CHRONIC RIGHT SHOULDER PAIN: ICD-10-CM

## 2023-09-14 PROCEDURE — 99204 OFFICE O/P NEW MOD 45 MIN: CPT | Mod: 25,S$GLB,, | Performed by: PHYSICAL MEDICINE & REHABILITATION

## 2023-09-14 PROCEDURE — 20553 NJX 1/MLT TRIGGER POINTS 3/>: CPT | Mod: S$GLB,,, | Performed by: PHYSICAL MEDICINE & REHABILITATION

## 2023-09-14 PROCEDURE — 20553 TRIGGER POINT INJECTION: ICD-10-PCS | Mod: S$GLB,,, | Performed by: PHYSICAL MEDICINE & REHABILITATION

## 2023-09-14 PROCEDURE — 99204 PR OFFICE/OUTPT VISIT, NEW, LEVL IV, 45-59 MIN: ICD-10-PCS | Mod: 25,S$GLB,, | Performed by: PHYSICAL MEDICINE & REHABILITATION

## 2023-09-14 RX ORDER — TIZANIDINE 4 MG/1
4 TABLET ORAL NIGHTLY PRN
Qty: 30 TABLET | Refills: 2 | Status: SHIPPED | OUTPATIENT
Start: 2023-09-14 | End: 2023-12-13

## 2023-09-14 RX ORDER — LIDOCAINE HYDROCHLORIDE 10 MG/ML
10 INJECTION INFILTRATION; PERINEURAL
Status: DISCONTINUED | OUTPATIENT
Start: 2023-09-14 | End: 2023-09-14

## 2023-09-14 RX ORDER — ETODOLAC 400 MG/1
400 TABLET, FILM COATED ORAL 2 TIMES DAILY PRN
Qty: 60 TABLET | Refills: 1 | Status: SHIPPED | OUTPATIENT
Start: 2023-09-14 | End: 2023-11-13

## 2023-09-14 RX ORDER — TRIAMCINOLONE ACETONIDE 40 MG/ML
40 INJECTION, SUSPENSION INTRA-ARTICULAR; INTRAMUSCULAR
Status: DISCONTINUED | OUTPATIENT
Start: 2023-09-14 | End: 2023-09-14

## 2023-09-14 RX ADMIN — LIDOCAINE HYDROCHLORIDE 10 ML: 10 INJECTION INFILTRATION; PERINEURAL at 04:09

## 2023-09-14 RX ADMIN — TRIAMCINOLONE ACETONIDE 40 MG: 40 INJECTION, SUSPENSION INTRA-ARTICULAR; INTRAMUSCULAR at 04:09

## 2023-09-14 RX ADMIN — LIDOCAINE HYDROCHLORIDE 10 ML: 10 INJECTION INFILTRATION; PERINEURAL at 03:09

## 2023-09-14 RX ADMIN — TRIAMCINOLONE ACETONIDE 40 MG: 40 INJECTION, SUSPENSION INTRA-ARTICULAR; INTRAMUSCULAR at 03:09
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
## 2023-09-14 NOTE — PROCEDURES
Trigger Point Injection    Performed by: Sheyla Cid MD  Authorized by: Sheyla Cid MD    Rhomboid:  Right  Trapezus:  Right    Consent Done?:  Yes (Written)    Pre-Procedure:   Indications:  Pain relief  Site marked: the procedure site was marked     Timeout: prior to procedure the correct patient, procedure, and site was verified    Prep: patient was prepped and draped in usual sterile fashion     Local anesthesia used?: Yes    Anesthesia:  Local infiltration  Local anesthetic:  Lidocaine 1% without epinephrine  Anesthetic total (ml):  9    Medications: 10 mL LIDOcaine HCL 10 mg/ml (1%) 10 mg/mL (1 %); 40 mg triamcinolone acetonide 40 mg/mL  Parascapular:  Right

## 2023-09-21 DIAGNOSIS — K21.9 GASTROESOPHAGEAL REFLUX DISEASE, UNSPECIFIED WHETHER ESOPHAGITIS PRESENT: ICD-10-CM

## 2023-09-25 RX ORDER — FAMOTIDINE 40 MG/1
40 TABLET, FILM COATED ORAL 2 TIMES DAILY
Qty: 180 TABLET | Refills: 1 | Status: SHIPPED | OUTPATIENT
Start: 2023-09-25

## 2023-09-26 DIAGNOSIS — K21.9 GASTROESOPHAGEAL REFLUX DISEASE, UNSPECIFIED WHETHER ESOPHAGITIS PRESENT: ICD-10-CM

## 2023-10-10 RX ORDER — FAMOTIDINE 40 MG/1
40 TABLET, FILM COATED ORAL 2 TIMES DAILY
Qty: 180 TABLET | Refills: 1 | OUTPATIENT
Start: 2023-10-10

## 2023-10-24 ENCOUNTER — TELEPHONE (OUTPATIENT)
Dept: PAIN MEDICINE | Facility: CLINIC | Age: 38
End: 2023-10-24

## 2023-10-24 ENCOUNTER — OFFICE VISIT (OUTPATIENT)
Dept: PAIN MEDICINE | Facility: CLINIC | Age: 38
End: 2023-10-24
Payer: COMMERCIAL

## 2023-10-24 VITALS
SYSTOLIC BLOOD PRESSURE: 131 MMHG | WEIGHT: 143.19 LBS | RESPIRATION RATE: 16 BRPM | HEART RATE: 80 BPM | BODY MASS INDEX: 25.37 KG/M2 | DIASTOLIC BLOOD PRESSURE: 89 MMHG | OXYGEN SATURATION: 99 % | HEIGHT: 63 IN

## 2023-10-24 DIAGNOSIS — M54.2 CERVICALGIA: Primary | ICD-10-CM

## 2023-10-24 DIAGNOSIS — M79.18 RHOMBOID PAIN: ICD-10-CM

## 2023-10-24 DIAGNOSIS — G89.29 CHRONIC PERISCAPULAR PAIN ON RIGHT SIDE: ICD-10-CM

## 2023-10-24 DIAGNOSIS — M25.511 CHRONIC PERISCAPULAR PAIN ON RIGHT SIDE: ICD-10-CM

## 2023-10-24 DIAGNOSIS — M75.51 SCAPULOTHORACIC BURSITIS OF RIGHT SHOULDER: ICD-10-CM

## 2023-10-24 DIAGNOSIS — G89.29 CHRONIC RIGHT SHOULDER PAIN: ICD-10-CM

## 2023-10-24 DIAGNOSIS — M25.511 CHRONIC RIGHT SHOULDER PAIN: ICD-10-CM

## 2023-10-24 PROCEDURE — 76942 ECHO GUIDE FOR BIOPSY: CPT | Mod: S$GLB,,, | Performed by: PHYSICAL MEDICINE & REHABILITATION

## 2023-10-24 PROCEDURE — 76942 PR U/S GUIDANCE FOR NEEDLE GUIDANCE: ICD-10-PCS | Mod: S$GLB,,, | Performed by: PHYSICAL MEDICINE & REHABILITATION

## 2023-10-24 PROCEDURE — 99214 OFFICE O/P EST MOD 30 MIN: CPT | Mod: 25,S$GLB,, | Performed by: PHYSICAL MEDICINE & REHABILITATION

## 2023-10-24 PROCEDURE — 20553 NJX 1/MLT TRIGGER POINTS 3/>: CPT | Mod: S$GLB,,, | Performed by: PHYSICAL MEDICINE & REHABILITATION

## 2023-10-24 PROCEDURE — 20553 TRIGGER POINT INJECTION: ICD-10-PCS | Mod: S$GLB,,, | Performed by: PHYSICAL MEDICINE & REHABILITATION

## 2023-10-24 PROCEDURE — 99214 PR OFFICE/OUTPT VISIT, EST, LEVL IV, 30-39 MIN: ICD-10-PCS | Mod: 25,S$GLB,, | Performed by: PHYSICAL MEDICINE & REHABILITATION

## 2023-10-24 RX ORDER — METHYLPREDNISOLONE ACETATE 40 MG/ML
40 INJECTION, SUSPENSION INTRA-ARTICULAR; INTRALESIONAL; INTRAMUSCULAR; SOFT TISSUE
Status: DISCONTINUED | OUTPATIENT
Start: 2023-10-24 | End: 2023-10-24 | Stop reason: HOSPADM

## 2023-10-24 RX ORDER — LIDOCAINE HYDROCHLORIDE 10 MG/ML
10 INJECTION, SOLUTION EPIDURAL; INFILTRATION; INTRACAUDAL; PERINEURAL
Status: DISCONTINUED | OUTPATIENT
Start: 2023-10-24 | End: 2023-10-24 | Stop reason: HOSPADM

## 2023-10-24 RX ADMIN — LIDOCAINE HYDROCHLORIDE 10 ML: 10 INJECTION, SOLUTION EPIDURAL; INFILTRATION; INTRACAUDAL; PERINEURAL at 03:10

## 2023-10-24 RX ADMIN — METHYLPREDNISOLONE ACETATE 40 MG: 40 INJECTION, SUSPENSION INTRA-ARTICULAR; INTRALESIONAL; INTRAMUSCULAR; SOFT TISSUE at 03:10

## 2023-10-24 NOTE — PROCEDURES
Trigger Point Injection    Performed by: Sheyla Cid MD  Authorized by: Sheyla Cid MD    Cervical Paraspinal:  Right  Rhomboid:  Right  Trapezus:  Right    Consent Done?:  Yes (Written)    Pre-Procedure:   Indications:  Pain  Site marked: the procedure site was marked     Timeout: prior to procedure the correct patient, procedure, and site was verified      Local anesthesia used?: Yes    Anesthesia:  Local infiltration  Local anesthetic:  Lidocaine 1% without epinephrine  Medications: 10 mL LIDOcaine (PF) 10 mg/ml (1%) 10 mg/mL (1 %); 40 mg methylPREDNISolone acetate 40 mg/mL  Scapular:  Right
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
## 2023-12-11 DIAGNOSIS — M54.2 CERVICALGIA: ICD-10-CM

## 2023-12-11 DIAGNOSIS — G89.29 CHRONIC RIGHT SHOULDER PAIN: ICD-10-CM

## 2023-12-11 DIAGNOSIS — G89.29 CHRONIC PERISCAPULAR PAIN ON RIGHT SIDE: ICD-10-CM

## 2023-12-11 DIAGNOSIS — M25.511 CHRONIC RIGHT SHOULDER PAIN: ICD-10-CM

## 2023-12-11 DIAGNOSIS — M25.511 CHRONIC PERISCAPULAR PAIN ON RIGHT SIDE: ICD-10-CM

## 2023-12-13 RX ORDER — TIZANIDINE 4 MG/1
4 TABLET ORAL NIGHTLY PRN
Qty: 90 TABLET | Refills: 2 | Status: SHIPPED | OUTPATIENT
Start: 2023-12-13 | End: 2024-09-08

## 2023-12-20 ENCOUNTER — OFFICE VISIT (OUTPATIENT)
Dept: PAIN MEDICINE | Facility: CLINIC | Age: 38
End: 2023-12-20
Payer: COMMERCIAL

## 2023-12-20 ENCOUNTER — OFFICE VISIT (OUTPATIENT)
Dept: OBSTETRICS AND GYNECOLOGY | Facility: CLINIC | Age: 38
End: 2023-12-20
Payer: COMMERCIAL

## 2023-12-20 VITALS
HEIGHT: 63 IN | DIASTOLIC BLOOD PRESSURE: 78 MMHG | SYSTOLIC BLOOD PRESSURE: 112 MMHG | WEIGHT: 139.5 LBS | BODY MASS INDEX: 24.72 KG/M2 | OXYGEN SATURATION: 99 % | HEART RATE: 83 BPM

## 2023-12-20 DIAGNOSIS — M54.12 CERVICAL RADICULOPATHY: ICD-10-CM

## 2023-12-20 DIAGNOSIS — M25.511 CHRONIC PERISCAPULAR PAIN ON RIGHT SIDE: ICD-10-CM

## 2023-12-20 DIAGNOSIS — M75.51 SCAPULOTHORACIC BURSITIS OF RIGHT SHOULDER: ICD-10-CM

## 2023-12-20 DIAGNOSIS — G89.29 CHRONIC PERISCAPULAR PAIN ON RIGHT SIDE: ICD-10-CM

## 2023-12-20 DIAGNOSIS — M25.511 CHRONIC RIGHT SHOULDER PAIN: ICD-10-CM

## 2023-12-20 DIAGNOSIS — G89.29 CHRONIC RIGHT SHOULDER PAIN: ICD-10-CM

## 2023-12-20 DIAGNOSIS — M79.18 RHOMBOID PAIN: ICD-10-CM

## 2023-12-20 DIAGNOSIS — M54.2 CERVICALGIA: Primary | ICD-10-CM

## 2023-12-20 DIAGNOSIS — Z00.00 WELLNESS EXAMINATION: Primary | ICD-10-CM

## 2023-12-20 DIAGNOSIS — Z76.89 ENCOUNTER TO ESTABLISH CARE: ICD-10-CM

## 2023-12-20 DIAGNOSIS — Z01.419 ROUTINE GYNECOLOGICAL EXAMINATION: Primary | ICD-10-CM

## 2023-12-20 PROCEDURE — 99395 PR PREVENTIVE VISIT,EST,18-39: ICD-10-PCS | Mod: S$GLB,,, | Performed by: OBSTETRICS & GYNECOLOGY

## 2023-12-20 PROCEDURE — 99395 PREV VISIT EST AGE 18-39: CPT | Mod: S$GLB,,, | Performed by: OBSTETRICS & GYNECOLOGY

## 2023-12-20 PROCEDURE — 99214 OFFICE O/P EST MOD 30 MIN: CPT | Mod: S$GLB,,, | Performed by: PHYSICAL MEDICINE & REHABILITATION

## 2023-12-20 PROCEDURE — 99214 PR OFFICE/OUTPT VISIT, EST, LEVL IV, 30-39 MIN: ICD-10-PCS | Mod: S$GLB,,, | Performed by: PHYSICAL MEDICINE & REHABILITATION
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
## 2023-12-21 ENCOUNTER — TELEPHONE (OUTPATIENT)
Dept: PAIN MEDICINE | Facility: CLINIC | Age: 38
End: 2023-12-21

## 2024-01-02 LAB — Lab: NORMAL

## 2024-01-09 ENCOUNTER — TELEPHONE (OUTPATIENT)
Dept: PAIN MEDICINE | Facility: CLINIC | Age: 39
End: 2024-01-09
Payer: COMMERCIAL

## 2024-01-09 NOTE — TELEPHONE ENCOUNTER
Called Mrs. Marin at phone # left on file no answer voicemail left asking for pt to please return our call to schedule follow up VV.

## 2024-01-09 NOTE — TELEPHONE ENCOUNTER
----- Message from Sheyla Cid MD sent at 1/8/2024 10:52 AM CST -----  Imaging reviewed.  There is diffuse mild facet athropathy which could be causing her pains as well as a disc bulge at C5-6 resulting in some foraminal narrowing which could also cause the pain she is having.     Please make sure patient has VV f/u with me to discuss next steps.      Please pull images from [facility] into Epic.

## 2024-01-18 ENCOUNTER — OFFICE VISIT (OUTPATIENT)
Dept: PAIN MEDICINE | Facility: CLINIC | Age: 39
End: 2024-01-18
Payer: COMMERCIAL

## 2024-01-18 DIAGNOSIS — M54.2 CERVICALGIA: ICD-10-CM

## 2024-01-18 DIAGNOSIS — M25.511 CHRONIC PERISCAPULAR PAIN ON RIGHT SIDE: ICD-10-CM

## 2024-01-18 DIAGNOSIS — M54.12 CERVICAL RADICULOPATHY: Primary | ICD-10-CM

## 2024-01-18 DIAGNOSIS — G89.29 CHRONIC PERISCAPULAR PAIN ON RIGHT SIDE: ICD-10-CM

## 2024-01-18 PROCEDURE — 99213 OFFICE O/P EST LOW 20 MIN: CPT | Mod: 95,,, | Performed by: PHYSICAL MEDICINE & REHABILITATION
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
## 2024-01-22 ENCOUNTER — TELEPHONE (OUTPATIENT)
Dept: PAIN MEDICINE | Facility: CLINIC | Age: 39
End: 2024-01-22
Payer: COMMERCIAL

## 2024-01-22 NOTE — TELEPHONE ENCOUNTER
----- Message from Sheyla Cid MD sent at 1/18/2024  8:07 AM CST -----  Please call and schedule patient for C7-T1 IL SHARON with local/MAC and 2-3 week f/u in clinic.    Thanks,  KP

## 2024-01-31 ENCOUNTER — TELEPHONE (OUTPATIENT)
Dept: ADMINISTRATIVE | Facility: CLINIC | Age: 39
End: 2024-01-31
Payer: COMMERCIAL

## 2024-02-01 DIAGNOSIS — M54.12 CERVICAL RADICULOPATHY: Primary | ICD-10-CM

## 2024-02-01 NOTE — PROGRESS NOTES
[facility]  [address] [place] [address].   [address]  [place] [address]  Phone: [phone]  Fax: [phone]    History & Physical         Provider: Dr. Sheyla Cid    Patient Name: Ron PETTY (age):1985  38 y.o.           Gender: female   Phone: [phone]     Referring Physician:      Subjective: No health changes since previous encounter. No changes in pain since procedure was scheduled at previous visit. Denies any fevers or infections. Denies any issues with clotting or bleeding.           History:      Full Medication List:    Current Outpatient Medications:     buPROPion (WELLBUTRIN) 75 MG tablet, Take 1 tablet (75 mg total) by mouth 2 (two) times daily., Disp: 60 tablet, Rfl: 11    famotidine (PEPCID) 40 MG tablet, Take 1 tablet (40 mg total) by mouth 2 (two) times daily., Disp: 180 tablet, Rfl: 1    fluticasone (FLONASE) 50 mcg/actuation nasal spray, 1 spray by Each Nare route once daily., Disp: , Rfl:     ipratropium (ATROVENT) 21 mcg (0.03 %) nasal spray, SPRAY 2 SPRAYS BY NASAL ROUTE TWICE A DAY, Disp: 12 mL, Rfl: 3    levocetirizine (XYZAL) 5 MG tablet, Take 5 mg by mouth every evening., Disp: , Rfl:     montelukast (SINGULAIR) 10 mg tablet, Take 1 tablet (10 mg total) by mouth once daily., Disp: 90 tablet, Rfl: 3    multivitamin capsule, Take 1 capsule by mouth once daily., Disp: , Rfl:     tiZANidine (ZANAFLEX) 4 MG tablet, TAKE 1 TABLET (4 MG TOTAL) BY MOUTH NIGHTLY AS NEEDED (PAIN, MUSCLE SPASM)., Disp: 90 tablet, Rfl: 2     Allergies:  Bactrim [sulfamethoxazole-trimethoprim] and Sulfa (sulfonamide antibiotics)     Medical History:   has a past medical history of Abnormal glandular Papanicolaou smear of cervix (2015 2:31:38 PM), Chronic sinusitis, Environmental allergies, and Human papillomavirus in conditions classified elsewhere and of unspecified site (2015 2:28:29  PM).    Surgical History:   has a past surgical history that includes Balloon sinuplasty of paranasal sinus; Sinus surgery; and robotic ablation or excision, endometriosis (2021).    Family History:  family history includes Arthritis in her father; Diabetes in her father; Hyperlipidemia in her mother; Hypertension in her father; Osteoporosis in her maternal aunt; Stroke in her paternal grandfather.    Social History:   reports that she has never smoked. She has never used smokeless tobacco. She reports current alcohol use. She reports that she does not use drugs.    Physical Examination:     GENERAL: Well appearing, in no acute distress, alert and oriented. If Abnormal: ___________________________________________    PSYCH:  Mood and affect appropriate.     If Abnormal: ___________________________________________    SKIN: Skin color, texture, turgor normal in procedure area   If Abnormal: ___________________________________________  No rashes or lesions which will impact the procedure.    CV: RRR with palpation of the radial artery.     If Abnormal: ___________________________________________    PULM: No evidence of respiratory difficulty, symmetric chest rise.   If Abnormal: ___________________________________________  Clear to auscultation.    NEURO: Alert. Oriented. Speech fluent and appropriate.    If Abnormal: ___________________________________________     Moving all extremities.    Plan:    Patient has been evaluated immediately prior to procedure and no significant changes in pain or significant medical changes noted at this time that would interfere with our planned procedure.    Proceed with procedure as planned.       Physician Signature: _____________________________________         Date: ___________   Time: ________

## 2024-02-07 LAB — B-HCG UR QL: NEGATIVE

## 2024-02-09 ENCOUNTER — OUTSIDE PLACE OF SERVICE (OUTPATIENT)
Dept: PAIN MEDICINE | Facility: CLINIC | Age: 39
End: 2024-02-09

## 2024-02-09 PROCEDURE — 62321 NJX INTERLAMINAR CRV/THRC: CPT | Mod: ,,, | Performed by: PHYSICAL MEDICINE & REHABILITATION

## 2024-02-29 ENCOUNTER — OFFICE VISIT (OUTPATIENT)
Dept: PAIN MEDICINE | Facility: CLINIC | Age: 39
End: 2024-02-29
Payer: COMMERCIAL

## 2024-02-29 VITALS
HEIGHT: 63 IN | DIASTOLIC BLOOD PRESSURE: 76 MMHG | BODY MASS INDEX: 24.63 KG/M2 | WEIGHT: 139 LBS | SYSTOLIC BLOOD PRESSURE: 107 MMHG | HEART RATE: 83 BPM

## 2024-02-29 DIAGNOSIS — G89.29 CHRONIC PERISCAPULAR PAIN ON RIGHT SIDE: ICD-10-CM

## 2024-02-29 DIAGNOSIS — M54.12 CERVICAL RADICULOPATHY: Primary | ICD-10-CM

## 2024-02-29 DIAGNOSIS — M54.2 CERVICALGIA: ICD-10-CM

## 2024-02-29 DIAGNOSIS — M25.511 CHRONIC PERISCAPULAR PAIN ON RIGHT SIDE: ICD-10-CM

## 2024-02-29 PROCEDURE — 99213 OFFICE O/P EST LOW 20 MIN: CPT | Mod: S$GLB,,, | Performed by: PHYSICAL MEDICINE & REHABILITATION
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
## 2024-04-29 ENCOUNTER — OFFICE VISIT (OUTPATIENT)
Dept: PRIMARY CARE CLINIC | Facility: CLINIC | Age: 39
End: 2024-04-29
Payer: COMMERCIAL

## 2024-04-29 VITALS
HEART RATE: 85 BPM | BODY MASS INDEX: 23.39 KG/M2 | SYSTOLIC BLOOD PRESSURE: 100 MMHG | DIASTOLIC BLOOD PRESSURE: 70 MMHG | RESPIRATION RATE: 16 BRPM | HEIGHT: 63 IN | WEIGHT: 132 LBS | OXYGEN SATURATION: 98 %

## 2024-04-29 DIAGNOSIS — Z76.89 ENCOUNTER TO ESTABLISH CARE: ICD-10-CM

## 2024-04-29 DIAGNOSIS — F41.1 GENERALIZED ANXIETY DISORDER: ICD-10-CM

## 2024-04-29 DIAGNOSIS — Z00.00 PREVENTATIVE HEALTH CARE: Primary | ICD-10-CM

## 2024-04-29 PROCEDURE — 99385 PREV VISIT NEW AGE 18-39: CPT | Mod: S$GLB,,, | Performed by: FAMILY MEDICINE

## 2024-04-29 RX ORDER — LINEZOLID 100 MG/5ML
600 GRANULE, FOR SUSPENSION ORAL
COMMUNITY

## 2024-04-29 RX ORDER — BUSPIRONE HYDROCHLORIDE 10 MG/1
TABLET ORAL
Qty: 90 TABLET | Refills: 0 | Status: SHIPPED | OUTPATIENT
Start: 2024-04-29 | End: 2024-05-22

## 2024-04-29 RX ORDER — BUPROPION HYDROCHLORIDE 300 MG/1
300 TABLET ORAL DAILY
Qty: 90 TABLET | Refills: 1 | Status: SHIPPED | OUTPATIENT
Start: 2024-04-29 | End: 2025-04-29

## 2024-04-29 NOTE — PROGRESS NOTES
Subjective     Patient ID: Ron Marin is a 38 y.o. female.    Chief Complaint: Establish Care    HPI    Here to est care  Trouble with anxiety  She is currently on wellbutrin 150 am and 75 pm  Utd pap and vacs    Review of Systems   Constitutional:  Negative for chills, diaphoresis, fatigue, fever and unexpected weight change.   HENT:  Negative for nasal congestion, hearing loss, rhinorrhea, sinus pressure/congestion, sore throat, trouble swallowing and voice change.    Eyes:  Negative for pain and visual disturbance.   Respiratory:  Negative for cough, chest tightness, shortness of breath and wheezing.    Cardiovascular:  Negative for chest pain, palpitations and leg swelling.   Gastrointestinal:  Negative for abdominal pain, constipation, diarrhea, nausea and vomiting.   Genitourinary:  Negative for decreased urine volume, dysuria, flank pain, frequency, hematuria, pelvic pain, vaginal bleeding and vaginal discharge.   Musculoskeletal:  Negative for arthralgias, back pain, myalgias and neck pain.   Integumentary:  Negative for color change, pallor and rash.   Neurological:  Negative for dizziness, syncope, weakness, light-headedness and headaches.   Hematological:  Negative for adenopathy.   Psychiatric/Behavioral:  Negative for decreased concentration, dysphoric mood and sleep disturbance. The patient is not nervous/anxious.           Objective     Physical Exam  Vitals reviewed.   Constitutional:       General: She is not in acute distress.     Appearance: She is well-developed. She is not diaphoretic.   HENT:      Head: Normocephalic and atraumatic.      Nose: Nose normal.      Mouth/Throat:      Mouth: Mucous membranes are moist.      Pharynx: Oropharynx is clear.   Eyes:      Conjunctiva/sclera: Conjunctivae normal.      Pupils: Pupils are equal, round, and reactive to light.   Neck:      Thyroid: No thyromegaly.      Vascular: No JVD.   Cardiovascular:      Rate and Rhythm: Normal rate and regular  rhythm.      Pulses: Normal pulses.      Heart sounds: Normal heart sounds. No murmur heard.     No friction rub. No gallop.   Pulmonary:      Effort: Pulmonary effort is normal. No respiratory distress.      Breath sounds: Normal breath sounds. No stridor. No wheezing or rales.   Abdominal:      General: Bowel sounds are normal. There is no distension.      Palpations: Abdomen is soft.      Tenderness: There is no abdominal tenderness.   Musculoskeletal:         General: No swelling or tenderness.      Cervical back: Normal range of motion and neck supple.      Right lower leg: No edema.      Left lower leg: No edema.   Lymphadenopathy:      Cervical: No cervical adenopathy.   Skin:     General: Skin is warm and dry.      Coloration: Skin is not pale.      Findings: No erythema or rash.   Neurological:      Mental Status: She is alert and oriented to person, place, and time.   Psychiatric:         Mood and Affect: Mood normal.         Behavior: Behavior normal.            Assessment and Plan     1. Preventative health care    2. Generalized anxiety disorder  -     busPIRone (BUSPAR) 10 MG tablet; Take 1/2 - 1 tablet tid prn for anxiety  Dispense: 90 tablet; Refill: 0  -     buPROPion (WELLBUTRIN XL) 300 MG 24 hr tablet; Take 1 tablet (300 mg total) by mouth once daily.  Dispense: 90 tablet; Refill: 1    3. Encounter to establish care  -     Ambulatory referral/consult to Family Practice               No follow-ups on file.

## 2024-05-22 DIAGNOSIS — F41.1 GENERALIZED ANXIETY DISORDER: ICD-10-CM

## 2024-05-22 RX ORDER — BUSPIRONE HYDROCHLORIDE 10 MG/1
TABLET ORAL
Qty: 270 TABLET | Refills: 1 | Status: SHIPPED | OUTPATIENT
Start: 2024-05-22

## 2024-06-06 RX ORDER — MONTELUKAST SODIUM 10 MG/1
10 TABLET ORAL
Qty: 90 TABLET | Refills: 3 | OUTPATIENT
Start: 2024-06-06

## 2024-06-13 NOTE — TELEPHONE ENCOUNTER
----- Message from Ketty Holland sent at 12/21/2023 11:44 AM CST -----  Contact: Patient  Patient called to consult with nurse or staff regarding her MRI. She wanted to verify if she is able to go [facility] for her MRI and also had a question about the authorization for the MRI as well. She would like a call back regarding this and can be reached at [phone]. Thanks/MR    
Left message  
[phone]

## 2024-11-01 DIAGNOSIS — F41.1 GENERALIZED ANXIETY DISORDER: ICD-10-CM

## 2024-11-01 RX ORDER — BUPROPION HYDROCHLORIDE 300 MG/1
300 TABLET ORAL
Qty: 90 TABLET | Refills: 1 | Status: SHIPPED | OUTPATIENT
Start: 2024-11-01

## 2024-12-30 DIAGNOSIS — F41.9 ANXIETY: Primary | ICD-10-CM

## 2024-12-30 RX ORDER — ALPRAZOLAM 0.5 MG/1
0.25 TABLET ORAL 2 TIMES DAILY PRN
Qty: 30 TABLET | Refills: 0 | Status: SHIPPED | OUTPATIENT
Start: 2024-12-30 | End: 2025-01-29